# Patient Record
Sex: FEMALE | Race: BLACK OR AFRICAN AMERICAN | NOT HISPANIC OR LATINO | Employment: FULL TIME | URBAN - METROPOLITAN AREA
[De-identification: names, ages, dates, MRNs, and addresses within clinical notes are randomized per-mention and may not be internally consistent; named-entity substitution may affect disease eponyms.]

---

## 2023-03-13 ENCOUNTER — HOSPITAL ENCOUNTER (EMERGENCY)
Facility: HOSPITAL | Age: 23
Discharge: HOME | End: 2023-03-14
Attending: EMERGENCY MEDICINE
Payer: MEDICAID

## 2023-03-13 DIAGNOSIS — M54.42 ACUTE BILATERAL LOW BACK PAIN WITH BILATERAL SCIATICA: Primary | ICD-10-CM

## 2023-03-13 DIAGNOSIS — M54.41 ACUTE BILATERAL LOW BACK PAIN WITH BILATERAL SCIATICA: Primary | ICD-10-CM

## 2023-03-13 PROCEDURE — 81025 URINE PREGNANCY TEST: CPT | Performed by: NURSE PRACTITIONER

## 2023-03-13 PROCEDURE — 99284 EMERGENCY DEPT VISIT MOD MDM: CPT

## 2023-03-13 PROCEDURE — 81003 URINALYSIS AUTO W/O SCOPE: CPT | Performed by: NURSE PRACTITIONER

## 2023-03-13 NOTE — Clinical Note
"Earl Rizzotrena Mcelroy was seen and treated in our emergency department on 3/13/2023.  She may return to work on 03/16/2023.       If you have any questions or concerns, please don't hesitate to call.      Tay Guerrero NP"

## 2023-03-14 VITALS
DIASTOLIC BLOOD PRESSURE: 84 MMHG | TEMPERATURE: 97 F | HEART RATE: 89 BPM | SYSTOLIC BLOOD PRESSURE: 149 MMHG | WEIGHT: 215.06 LBS | RESPIRATION RATE: 16 BRPM | OXYGEN SATURATION: 99 %

## 2023-03-14 LAB

## 2023-03-14 PROCEDURE — 96372 THER/PROPH/DIAG INJ SC/IM: CPT | Performed by: NURSE PRACTITIONER

## 2023-03-14 PROCEDURE — 63600175 PHARM REV CODE 636 W HCPCS: Performed by: NURSE PRACTITIONER

## 2023-03-14 RX ORDER — METHOCARBAMOL 500 MG/1
500 TABLET, FILM COATED ORAL 3 TIMES DAILY PRN
Qty: 30 TABLET | Refills: 0 | Status: SHIPPED | OUTPATIENT
Start: 2023-03-14 | End: 2023-03-19

## 2023-03-14 RX ORDER — DICLOFENAC SODIUM 50 MG/1
50 TABLET, DELAYED RELEASE ORAL 3 TIMES DAILY PRN
Qty: 15 TABLET | Refills: 0 | Status: ON HOLD | OUTPATIENT
Start: 2023-03-14 | End: 2023-08-09 | Stop reason: HOSPADM

## 2023-03-14 RX ORDER — DEXAMETHASONE 4 MG/1
4 TABLET ORAL DAILY
Qty: 5 TABLET | Refills: 0 | Status: SHIPPED | OUTPATIENT
Start: 2023-03-14 | End: 2023-03-19

## 2023-03-14 RX ORDER — KETOROLAC TROMETHAMINE 30 MG/ML
30 INJECTION, SOLUTION INTRAMUSCULAR; INTRAVENOUS
Status: COMPLETED | OUTPATIENT
Start: 2023-03-14 | End: 2023-03-14

## 2023-03-14 RX ORDER — KETOROLAC TROMETHAMINE 10 MG/1
10 TABLET, FILM COATED ORAL
Status: DISCONTINUED | OUTPATIENT
Start: 2023-03-14 | End: 2023-03-14

## 2023-03-14 RX ADMIN — KETOROLAC TROMETHAMINE 30 MG: 30 INJECTION, SOLUTION INTRAMUSCULAR; INTRAVENOUS at 01:03

## 2023-03-14 NOTE — ED PROVIDER NOTES
HISTORY     Chief Complaint   Patient presents with    Back Pain     Lower back pain radiating to hips and thigh. Denies CP, trauma or strain to back. OTC meds with no relief. Ambulatory to triage      Review of patient's allergies indicates:  Not on File     HPI   The history is provided by the patient.   Back Pain   This is a new problem. The current episode started two days ago. The problem occurs throughout the day. The problem has been waxing and waning. The pain is associated with no known injury. The pain is present in the lumbar spine. The quality of the pain is described as aching. The pain radiates to the left thigh and right thigh. The pain is at a severity of 7/10. The symptoms are aggravated by bending, twisting and certain positions. Associated symptoms include leg pain. Pertinent negatives include no chest pain, no fever, no numbness, no weight loss, no headaches, no abdominal pain, no abdominal swelling, no bowel incontinence, no perianal numbness, no bladder incontinence, no dysuria, no pelvic pain, no paresthesias, no paresis, no tingling and no weakness. She has tried NSAIDs for the symptoms. The treatment provided no relief.      PCP: GUILLAUME Farias     Past Medical History:  No past medical history on file.     Past Surgical History:  No past surgical history on file.     Family History:  No family history on file.     Social History:  Social History     Tobacco Use    Smoking status: Not on file    Smokeless tobacco: Not on file   Substance and Sexual Activity    Alcohol use: Not on file    Drug use: Not on file    Sexual activity: Not on file         ROS   Review of Systems   Constitutional:  Negative for fever and weight loss.   HENT:  Negative for sore throat.    Respiratory:  Negative for shortness of breath.    Cardiovascular:  Negative for chest pain.   Gastrointestinal:  Negative for abdominal pain, bowel incontinence and nausea.   Genitourinary:  Negative for bladder  incontinence, dysuria and pelvic pain.   Musculoskeletal:  Positive for back pain.        Leg pain   Skin:  Negative for rash.   Neurological:  Negative for tingling, weakness, numbness, headaches and paresthesias.   Hematological:  Does not bruise/bleed easily.     PHYSICAL EXAM     Initial Vitals [03/13/23 2058]   BP Pulse Resp Temp SpO2   (!) 140/76 80 17 98 °F (36.7 °C) 97 %      MAP       --           Physical Exam    Constitutional: She appears well-developed and well-nourished. No distress.   HENT:   Head: Normocephalic and atraumatic.   Eyes: Conjunctivae are normal. Pupils are equal, round, and reactive to light.   Neck: Neck supple.   Normal range of motion.  Cardiovascular:  Normal rate, regular rhythm and normal heart sounds.           Pulmonary/Chest: Breath sounds normal.   Abdominal: Abdomen is soft. Bowel sounds are normal. She exhibits no distension. There is no abdominal tenderness. There is no rebound.   Musculoskeletal:         General: No edema. Normal range of motion.      Cervical back: Normal range of motion and neck supple.     Neurological: She is alert and oriented to person, place, and time. She has normal strength.   Skin: Skin is warm and dry.   Psychiatric: She has a normal mood and affect.        ED COURSE   Procedures  ED ONGOING VITALS:  Vitals:    03/13/23 2058 03/13/23 2315 03/14/23 0056   BP: (!) 140/76 135/77 (!) 149/84   Pulse: 80 85 89   Resp: 17 16 16   Temp: 98 °F (36.7 °C) 97.4 °F (36.3 °C) 97.2 °F (36.2 °C)   TempSrc: Oral  Oral   SpO2: 97% 98% 99%   Weight: 97.6 kg (215 lb 0.9 oz)           ABNORMAL LAB VALUES:  Labs Reviewed   URINALYSIS, REFLEX TO URINE CULTURE    Narrative:     Specimen Source->Urine   PREGNANCY TEST, URINE RAPID    Narrative:     Specimen Source->Urine         ALL LAB VALUES:  Results for orders placed or performed during the hospital encounter of 03/13/23   Urinalysis, Reflex to Urine Culture Urine, Clean Catch    Specimen: Urine   Result Value Ref  Range    Specimen UA Urine, Clean Catch     Color, UA Yellow Yellow, Straw, Suki    Appearance, UA Clear Clear    pH, UA 8.0 5.0 - 8.0    Specific Gravity, UA 1.025 1.005 - 1.030    Protein, UA Negative Negative    Glucose, UA Negative Negative    Ketones, UA Negative Negative    Bilirubin (UA) Negative Negative    Occult Blood UA Negative Negative    Nitrite, UA Negative Negative    Urobilinogen, UA Negative <2.0 EU/dL    Leukocytes, UA Negative Negative   Pregnancy, urine rapid (UPT)   Result Value Ref Range    Preg Test, Ur Negative            RADIOLOGY STUDIES:  Imaging Results              X-Ray Lumbar Spine Ap And Lateral (Final result)  Result time 03/14/23 08:56:15      Final result by Terry López MD (03/14/23 08:56:15)                   Impression:      No acute abnormality.      Electronically signed by: Terry López  Date:    03/14/2023  Time:    08:56               Narrative:    EXAMINATION:  XR LUMBAR SPINE AP AND LATERAL    CLINICAL HISTORY:  low back pain;    TECHNIQUE:  Three views of the lumbar spine were performed.    COMPARISON:  None    FINDINGS:  Alignment: Alignment is maintained.    Vertebrae: Vertebral body heights are maintained.  No suspicious appearing lytic or blastic lesions.    Discs and facets: Disc heights are maintained. Facet joints are unremarkable.    Miscellaneous: No additional findings.                                                The above vital signs and test results have been reviewed by the emergency provider.     ED Medications:  Discharge Medication List as of 3/14/2023  1:22 AM        START taking these medications    Details   dexAMETHasone (DECADRON) 4 MG Tab Take 1 tablet (4 mg total) by mouth once daily. for 5 days, Starting Tue 3/14/2023, Until Sun 3/19/2023, Print      diclofenac (VOLTAREN) 50 MG EC tablet Take 1 tablet (50 mg total) by mouth 3 (three) times daily as needed., Starting Tue 3/14/2023, Print      methocarbamoL (ROBAXIN) 500 MG Tab Take 1 tablet  (500 mg total) by mouth 3 (three) times daily as needed., Starting Tue 3/14/2023, Until Sun 3/19/2023 at 2359, Print           Discharge Medications:  Discharge Medication List as of 3/14/2023  1:22 AM        START taking these medications    Details   dexAMETHasone (DECADRON) 4 MG Tab Take 1 tablet (4 mg total) by mouth once daily. for 5 days, Starting Tue 3/14/2023, Until Sun 3/19/2023, Print      diclofenac (VOLTAREN) 50 MG EC tablet Take 1 tablet (50 mg total) by mouth 3 (three) times daily as needed., Starting Tue 3/14/2023, Print      methocarbamoL (ROBAXIN) 500 MG Tab Take 1 tablet (500 mg total) by mouth 3 (three) times daily as needed., Starting Tue 3/14/2023, Until Sun 3/19/2023 at 2359, Print             Follow-up Information       GUILLAUME Farias.    Specialty: Family Medicine  Contact information:  [address] [place] [address] [phone]               [facility] - Emergency Dept..    Specialty: Emergency Medicine  Contact information:  [address] [facility] [address] [phone]                          I discussed with patient and/or family/caretaker that evaluation in the ED does not suggest any emergent or life threatening medical conditions requiring immediate intervention beyond what was provided in the ED, and I believe patient is safe for discharge. Regardless, an unremarkable evaluation in the ED does not preclude the development or presence of a serious or life threatening condition. As such, patient was instructed to return immediately for any worsening or change in current symptoms.    Pre-hypertension/Hypertension: The pt has been informed that they may have pre-hypertension or hypertension based on a blood pressure reading in the ED. I recommend that the pt call the PCP listed on their discharge instructions or a physician of their choice this week to arrange f/u for further evaluation of possible pre-hypertension or hypertension.      Regarding BACK PAIN, I advised patient to rest and slowly start to increase activity as pain decreases or as tolerable.  Also recommend the use of ice and heat. Explained to patient that ice will help decrease swelling and pain and prevent tissue damage (verbalized importance of covering ice with a towel and not applying it directly to skin and applying it for 20-30 minutes every 2 hours as needed). Further explained that heat will help decrease pain and muscle spasms (instructed patient to not fall asleep with heating pad and to apply heat to lower back for 20-30 minutes every 2 hours as needed). For prevention, I recommended that the patient use correct body movements (bend at the hips and knees when picking up objects and use leg muscles as you lift the load; keep objects close to chest when lifting it; and avoid twisting or lifting anything above the waist; change position often when standing for long periods of time; never reach, pull, or push while seated; exercise frequently and warm up before exercising; and maintain a healthy weight.  Follow up with primary care provider if no improvement is noticed in next three days.  Take all medications as prescribed and avoid operating heavy machinery or driving if prescribed pain medications or muscle relaxants.  Instructed patient to return to the emergency department if they develop incontinence, notice increased swelling or pain in lower back; begin to have difficulty moving lower extremities; or develop numbness to legs.       MEDICAL DECISION MAKING                 CLINICAL IMPRESSION       ICD-10-CM ICD-9-CM   1. Acute bilateral low back pain with bilateral sciatica  M54.42 724.2    M54.41 724.3       Disposition:   Disposition: Discharged  Condition: Stable       Tay Guerrero NP  03/14/23 1257

## 2023-03-14 NOTE — ED NOTES
Patient was given urine cup and instructed on clean catch specimen collection and sent back to [facility].

## 2023-03-14 NOTE — FIRST PROVIDER EVALUATION
Medical screening examination initiated.  I have conducted a focused provider triage encounter, findings are as follows:    Brief history of present illness:  22-year-old female presents emergency room with lower back pain really radiating to bilateral thighs    Vitals:    03/13/23 2058   BP: (!) 140/76   BP Location: Right arm   Patient Position: Sitting   Pulse: 80   Resp: 17   Temp: 98 °F (36.7 °C)   TempSrc: Oral   SpO2: 97%   Weight: 97.6 kg (215 lb 0.9 oz)       Pertinent physical exam:  Vital signs stable no acute distress no abnormal gait    Brief workup plan:  Urinalysis pregnancy test radiographs if negative    Preliminary workup initiated; this workup will be continued and followed by the physician or advanced practice provider that is assigned to the patient when roomed.

## 2023-04-13 ENCOUNTER — HOSPITAL ENCOUNTER (EMERGENCY)
Facility: HOSPITAL | Age: 23
Discharge: HOME | End: 2023-04-13
Attending: EMERGENCY MEDICINE
Payer: MEDICAID

## 2023-04-13 VITALS
WEIGHT: 222.31 LBS | DIASTOLIC BLOOD PRESSURE: 64 MMHG | TEMPERATURE: 98 F | BODY MASS INDEX: 37.95 KG/M2 | OXYGEN SATURATION: 98 % | HEIGHT: 64 IN | HEART RATE: 82 BPM | RESPIRATION RATE: 20 BRPM | SYSTOLIC BLOOD PRESSURE: 122 MMHG

## 2023-04-13 DIAGNOSIS — R11.2 NAUSEA VOMITING AND DIARRHEA: ICD-10-CM

## 2023-04-13 DIAGNOSIS — R19.7 NAUSEA VOMITING AND DIARRHEA: ICD-10-CM

## 2023-04-13 DIAGNOSIS — R10.31 RIGHT LOWER QUADRANT ABDOMINAL PAIN: Primary | ICD-10-CM

## 2023-04-13 LAB
ALBUMIN SERPL BCP-MCNC: 3.7 G/DL (ref 3.5–5.2)
ALP SERPL-CCNC: 81 U/L (ref 55–135)
ALT SERPL W/O P-5'-P-CCNC: 8 U/L (ref 10–44)
ANION GAP SERPL CALC-SCNC: 7 MMOL/L (ref 8–16)
AST SERPL-CCNC: 15 U/L (ref 10–40)
B-HCG UR QL: NEGATIVE
BASOPHILS # BLD AUTO: 0.02 K/UL (ref 0–0.2)
BASOPHILS NFR BLD: 0.2 % (ref 0–1.9)
BILIRUB SERPL-MCNC: 0.5 MG/DL (ref 0.1–1)
BILIRUB UR QL STRIP: NEGATIVE
BUN SERPL-MCNC: 6 MG/DL (ref 6–20)
CALCIUM SERPL-MCNC: 9.5 MG/DL (ref 8.7–10.5)
CHLORIDE SERPL-SCNC: 107 MMOL/L (ref 95–110)
CLARITY UR: CLEAR
CO2 SERPL-SCNC: 23 MMOL/L (ref 23–29)
COLOR UR: YELLOW
CREAT SERPL-MCNC: 0.7 MG/DL (ref 0.5–1.4)
DIFFERENTIAL METHOD: ABNORMAL
EOSINOPHIL # BLD AUTO: 0.1 K/UL (ref 0–0.5)
EOSINOPHIL NFR BLD: 1.1 % (ref 0–8)
ERYTHROCYTE [DISTWIDTH] IN BLOOD BY AUTOMATED COUNT: 14 % (ref 11.5–14.5)
EST. GFR  (NO RACE VARIABLE): >60 ML/MIN/1.73 M^2
GLUCOSE SERPL-MCNC: 88 MG/DL (ref 70–110)
GLUCOSE UR QL STRIP: NEGATIVE
HCT VFR BLD AUTO: 36.3 % (ref 37–48.5)
HCV AB SERPL QL IA: NEGATIVE
HEP C VIRUS HOLD SPECIMEN: NORMAL
HGB BLD-MCNC: 11.5 G/DL (ref 12–16)
HGB UR QL STRIP: NEGATIVE
HIV 1+2 AB+HIV1 P24 AG SERPL QL IA: NEGATIVE
IMM GRANULOCYTES # BLD AUTO: 0.03 K/UL (ref 0–0.04)
IMM GRANULOCYTES NFR BLD AUTO: 0.3 % (ref 0–0.5)
KETONES UR QL STRIP: NEGATIVE
LEUKOCYTE ESTERASE UR QL STRIP: NEGATIVE
LIPASE SERPL-CCNC: 21 U/L (ref 4–60)
LYMPHOCYTES # BLD AUTO: 2.1 K/UL (ref 1–4.8)
LYMPHOCYTES NFR BLD: 22.6 % (ref 18–48)
MCH RBC QN AUTO: 27.1 PG (ref 27–31)
MCHC RBC AUTO-ENTMCNC: 31.7 G/DL (ref 32–36)
MCV RBC AUTO: 86 FL (ref 82–98)
MONOCYTES # BLD AUTO: 0.6 K/UL (ref 0.3–1)
MONOCYTES NFR BLD: 6.3 % (ref 4–15)
NEUTROPHILS # BLD AUTO: 6.4 K/UL (ref 1.8–7.7)
NEUTROPHILS NFR BLD: 69.5 % (ref 38–73)
NITRITE UR QL STRIP: NEGATIVE
NRBC BLD-RTO: 0 /100 WBC
PH UR STRIP: 8 [PH] (ref 5–8)
PLATELET # BLD AUTO: 296 K/UL (ref 150–450)
PMV BLD AUTO: 10.3 FL (ref 9.2–12.9)
POTASSIUM SERPL-SCNC: 4.2 MMOL/L (ref 3.5–5.1)
PROT SERPL-MCNC: 7.8 G/DL (ref 6–8.4)
PROT UR QL STRIP: NEGATIVE
RBC # BLD AUTO: 4.24 M/UL (ref 4–5.4)
SODIUM SERPL-SCNC: 137 MMOL/L (ref 136–145)
SP GR UR STRIP: 1.01 (ref 1–1.03)
URN SPEC COLLECT METH UR: NORMAL
UROBILINOGEN UR STRIP-ACNC: NEGATIVE EU/DL
WBC # BLD AUTO: 9.17 K/UL (ref 3.9–12.7)

## 2023-04-13 PROCEDURE — 63600175 PHARM REV CODE 636 W HCPCS

## 2023-04-13 PROCEDURE — 99285 EMERGENCY DEPT VISIT HI MDM: CPT | Mod: 25

## 2023-04-13 PROCEDURE — 81003 URINALYSIS AUTO W/O SCOPE: CPT | Performed by: REGISTERED NURSE

## 2023-04-13 PROCEDURE — 81025 URINE PREGNANCY TEST: CPT | Performed by: REGISTERED NURSE

## 2023-04-13 PROCEDURE — 83690 ASSAY OF LIPASE: CPT | Performed by: REGISTERED NURSE

## 2023-04-13 PROCEDURE — 25000003 PHARM REV CODE 250: Performed by: REGISTERED NURSE

## 2023-04-13 PROCEDURE — 80053 COMPREHEN METABOLIC PANEL: CPT | Performed by: REGISTERED NURSE

## 2023-04-13 PROCEDURE — 86803 HEPATITIS C AB TEST: CPT | Performed by: EMERGENCY MEDICINE

## 2023-04-13 PROCEDURE — 87389 HIV-1 AG W/HIV-1&-2 AB AG IA: CPT | Performed by: EMERGENCY MEDICINE

## 2023-04-13 PROCEDURE — 96374 THER/PROPH/DIAG INJ IV PUSH: CPT

## 2023-04-13 PROCEDURE — 96361 HYDRATE IV INFUSION ADD-ON: CPT

## 2023-04-13 PROCEDURE — 85025 COMPLETE CBC W/AUTO DIFF WBC: CPT | Performed by: REGISTERED NURSE

## 2023-04-13 RX ORDER — ONDANSETRON 2 MG/ML
INJECTION INTRAMUSCULAR; INTRAVENOUS
Status: COMPLETED
Start: 2023-04-13 | End: 2023-04-13

## 2023-04-13 RX ORDER — ONDANSETRON 2 MG/ML
4 INJECTION INTRAMUSCULAR; INTRAVENOUS
Status: COMPLETED | OUTPATIENT
Start: 2023-04-13 | End: 2023-04-13

## 2023-04-13 RX ORDER — ACETAMINOPHEN 325 MG/1
650 TABLET ORAL
Status: COMPLETED | OUTPATIENT
Start: 2023-04-13 | End: 2023-04-13

## 2023-04-13 RX ORDER — ONDANSETRON 4 MG/1
4 TABLET, ORALLY DISINTEGRATING ORAL EVERY 8 HOURS PRN
Qty: 12 TABLET | Refills: 0 | Status: SHIPPED | OUTPATIENT
Start: 2023-04-13 | End: 2023-10-12

## 2023-04-13 RX ORDER — ACETAMINOPHEN 325 MG/1
TABLET ORAL
Status: COMPLETED
Start: 2023-04-13 | End: 2023-04-13

## 2023-04-13 RX ADMIN — SODIUM CHLORIDE 1000 ML: 9 INJECTION, SOLUTION INTRAVENOUS at 12:04

## 2023-04-13 RX ADMIN — ONDANSETRON 4 MG: 2 INJECTION INTRAMUSCULAR; INTRAVENOUS at 12:04

## 2023-04-13 RX ADMIN — ACETAMINOPHEN 650 MG: 325 TABLET ORAL at 12:04

## 2023-04-13 NOTE — Clinical Note
"Earl Rizzotrena Mcelroy was seen and treated in our emergency department on 4/13/2023.  She may return to work on 04/15/2023.       If you have any questions or concerns, please don't hesitate to call.      Avinash Pollard Jr., FNP"

## 2023-04-13 NOTE — Clinical Note
"Earl Rizzotrena Mcelroy was seen and treated in our emergency department on 4/13/2023.  She may return to school on 04/15/2023.      If you have any questions or concerns, please don't hesitate to call.      Avinash Pollard Jr., FNP"

## 2023-04-13 NOTE — ED PROVIDER NOTES
Encounter Date: 4/13/2023       History     Chief Complaint   Patient presents with    Abdominal Pain     RLQ pain onset 2 days ago, constant, worsening. Nausea, diarrhea onset last night.     22-year-old female presents emergency department with complaints of right flank pain that began 2 days ago.  Associated symptoms include nausea/vomiting/diarrhea that began last night.  Patient denies any fever, chills, chest pain, shortness of breath or any other symptoms at this time.    The history is provided by the patient.   Review of patient's allergies indicates:  No Known Allergies  No past medical history on file.  No past surgical history on file.  No family history on file.     Review of Systems   Constitutional:  Negative for fever.   HENT:  Negative for sore throat.    Respiratory:  Negative for shortness of breath.    Cardiovascular:  Negative for chest pain.   Gastrointestinal:  Positive for abdominal pain, diarrhea, nausea and vomiting.   Genitourinary:  Negative for dysuria.   Musculoskeletal:  Negative for back pain.   Skin:  Negative for rash.   Neurological:  Negative for weakness.   Hematological:  Does not bruise/bleed easily.   All other systems reviewed and are negative.    Physical Exam     Initial Vitals [04/13/23 1123]   BP Pulse Resp Temp SpO2   (!) 120/59 85 16 97.9 °F (36.6 °C) 98 %      MAP       --         Physical Exam    Constitutional: She appears well-developed and well-nourished. She is not diaphoretic. No distress.   HENT:   Head: Normocephalic and atraumatic.   Eyes: Conjunctivae and EOM are normal. Pupils are equal, round, and reactive to light.   Neck: Neck supple.   Normal range of motion.  Cardiovascular:  Normal rate, regular rhythm and normal heart sounds.           No murmur heard.  Pulmonary/Chest: Breath sounds normal. No respiratory distress. She has no wheezes. She has no rales.   Abdominal: Abdomen is soft. Bowel sounds are normal. There is abdominal tenderness in the right  upper quadrant and right lower quadrant.   There is right CVA tenderness.There is no rebound and no guarding.   Musculoskeletal:         General: No tenderness or edema. Normal range of motion.      Cervical back: Normal range of motion and neck supple.     Neurological: She is alert and oriented to person, place, and time. No cranial nerve deficit. GCS score is 15. GCS eye subscore is 4. GCS verbal subscore is 5. GCS motor subscore is 6.   Skin: Skin is warm and dry. Capillary refill takes less than 2 seconds.   Psychiatric: She has a normal mood and affect. Thought content normal.       ED Course   Procedures  Labs Reviewed   CBC W/ AUTO DIFFERENTIAL - Abnormal; Notable for the following components:       Result Value    Hemoglobin 11.5 (*)     Hematocrit 36.3 (*)     MCHC 31.7 (*)     All other components within normal limits    Narrative:     Release to patient->Immediate   COMPREHENSIVE METABOLIC PANEL - Abnormal; Notable for the following components:    ALT 8 (*)     Anion Gap 7 (*)     All other components within normal limits    Narrative:     Release to patient->Immediate   HIV 1 / 2 ANTIBODY    Narrative:     Release to patient->Immediate   HEPATITIS C ANTIBODY    Narrative:     Release to patient->Immediate   HEP C VIRUS HOLD SPECIMEN    Narrative:     Release to patient->Immediate   URINALYSIS, REFLEX TO URINE CULTURE    Narrative:     Specimen Source->Urine   PREGNANCY TEST, URINE RAPID    Narrative:     Specimen Source->Urine   LIPASE    Narrative:     Release to patient->Immediate          Imaging Results              CT Renal Stone Study ABD Pelvis WO (Final result)  Result time 04/13/23 13:14:38      Final result by Alex Byers MD (04/13/23 13:14:38)                   Impression:      No evidence of obstructive uropathy.    Evaluation of solid organ and vascular pathology is limited due to lack of IV contrast.    All CT scans at this facility use dose modulation, iterative reconstruction,  and/or weight based dosing when appropriate to reduce radiation dose to as low as reasonable achievable.      Electronically signed by: Alex Byers MD  Date:    04/13/2023  Time:    13:14               Narrative:    EXAMINATION:  CT RENAL STONE STUDY ABD PELVIS WO    CLINICAL HISTORY:  Flank pain, kidney stone suspected;    TECHNIQUE:  Low dose axial images, sagittal and coronal reformations were obtained from the lung bases to the pubic symphysis.  Oral contrast was not administered.    COMPARISON:  None    FINDINGS:  Heart: Normal size. No effusion.    Lung Bases: Clear.    Liver: Normal size and attenuation. No focal lesions.    Gallbladder: No calcified gallstones.    Bile Ducts: No dilatation.    Pancreas: No obvious mass. No peripancreatic fat stranding.    Spleen: Normal.    Adrenals: Normal.    Kidneys/Ureters: No mass, hydroureteronephrosis, or nephroureterolithiasis.    Bladder: No wall thickening.    Reproductive organs: Normal.  Fluid is seen within the endometrial canal.  2.3 cm cyst or follicle suspected within the left ovary.    GI Tract/Mesentery: No evidence of bowel obstruction or inflammation.  Mild constipation.  No evidence of appendicitis.    Peritoneal Space: No ascites or free air.    Retroperitoneum: No significant adenopathy.    Abdominal wall: Normal.    Vasculature: No aneurysm.    Bones: No acute fracture. No suspicious lytic or sclerotic lesions.                                       Medications   sodium chloride 0.9% bolus 1,000 mL 1,000 mL (1,000 mLs Intravenous New Bag 4/13/23 1207)   ondansetron injection 4 mg (4 mg Intravenous Given 4/13/23 1205)   acetaminophen tablet 650 mg (0 mg Oral Override Pull 4/13/23 1215)     Medical Decision Making:   Clinical Tests:   Lab Tests: Ordered and Reviewed  Radiological Study: Ordered and Reviewed                        Clinical Impression:   Final diagnoses:  [R10.31] Right lower quadrant abdominal pain (Primary)  [R11.2, R19.7] Nausea  vomiting and diarrhea        ED Disposition Condition    Discharge Stable          ED Prescriptions       Medication Sig Dispense Start Date End Date Auth. Provider    ondansetron (ZOFRAN-ODT) 4 MG TbDL Take 1 tablet (4 mg total) by mouth every 8 (eight) hours as needed (vomiting). 12 tablet 4/13/2023 -- GUILLAUME Rivera Jr.          Follow-up Information       Follow up With Specialties Details Why Contact Info    [facility] In 1 week  [address] [place] [address] [phone]               GUILLAUME Rivera Jr.  04/13/23 3101

## 2023-08-05 ENCOUNTER — HOSPITAL ENCOUNTER (EMERGENCY)
Facility: HOSPITAL | Age: 23
Discharge: OTHER INSTITUTION | End: 2023-08-05
Attending: EMERGENCY MEDICINE
Payer: MEDICAID

## 2023-08-05 ENCOUNTER — HOSPITAL ENCOUNTER (INPATIENT)
Facility: HOSPITAL | Age: 23
LOS: 4 days | Discharge: HOME | DRG: 032 | End: 2023-08-09
Attending: STUDENT IN AN ORGANIZED HEALTH CARE EDUCATION/TRAINING PROGRAM | Admitting: NEUROLOGICAL SURGERY
Payer: MEDICAID

## 2023-08-05 VITALS
TEMPERATURE: 99 F | SYSTOLIC BLOOD PRESSURE: 118 MMHG | DIASTOLIC BLOOD PRESSURE: 62 MMHG | RESPIRATION RATE: 16 BRPM | HEIGHT: 64 IN | BODY MASS INDEX: 38.3 KG/M2 | WEIGHT: 224.31 LBS | HEART RATE: 66 BPM | OXYGEN SATURATION: 98 %

## 2023-08-05 DIAGNOSIS — G91.9 HYDROCEPHALUS, UNSPECIFIED TYPE: ICD-10-CM

## 2023-08-05 DIAGNOSIS — G91.9 HYDROCEPHALUS, UNSPECIFIED TYPE: Primary | ICD-10-CM

## 2023-08-05 DIAGNOSIS — Q03.0 AQUEDUCTAL STENOSIS: ICD-10-CM

## 2023-08-05 DIAGNOSIS — R51.9 NONINTRACTABLE HEADACHE, UNSPECIFIED CHRONICITY PATTERN, UNSPECIFIED HEADACHE TYPE: ICD-10-CM

## 2023-08-05 DIAGNOSIS — R51.9 NONINTRACTABLE HEADACHE, UNSPECIFIED CHRONICITY PATTERN, UNSPECIFIED HEADACHE TYPE: Primary | ICD-10-CM

## 2023-08-05 LAB
ALBUMIN SERPL BCP-MCNC: 3.8 G/DL (ref 3.5–5.2)
ALP SERPL-CCNC: 81 U/L (ref 55–135)
ALT SERPL W/O P-5'-P-CCNC: 11 U/L (ref 10–44)
ANION GAP SERPL CALC-SCNC: 13 MMOL/L (ref 8–16)
AST SERPL-CCNC: 27 U/L (ref 10–40)
B-HCG UR QL: NEGATIVE
BASOPHILS # BLD AUTO: 0.02 K/UL (ref 0–0.2)
BASOPHILS NFR BLD: 0.2 % (ref 0–1.9)
BILIRUB SERPL-MCNC: 0.8 MG/DL (ref 0.1–1)
BILIRUB UR QL STRIP: NEGATIVE
BUN SERPL-MCNC: 10 MG/DL (ref 6–20)
CALCIUM SERPL-MCNC: 9.8 MG/DL (ref 8.7–10.5)
CHLORIDE SERPL-SCNC: 106 MMOL/L (ref 95–110)
CLARITY UR: ABNORMAL
CO2 SERPL-SCNC: 16 MMOL/L (ref 23–29)
COLOR UR: YELLOW
CREAT SERPL-MCNC: 0.8 MG/DL (ref 0.5–1.4)
DIFFERENTIAL METHOD: ABNORMAL
EOSINOPHIL # BLD AUTO: 0.1 K/UL (ref 0–0.5)
EOSINOPHIL NFR BLD: 0.7 % (ref 0–8)
ERYTHROCYTE [DISTWIDTH] IN BLOOD BY AUTOMATED COUNT: 13.5 % (ref 11.5–14.5)
EST. GFR  (NO RACE VARIABLE): >60 ML/MIN/1.73 M^2
GLUCOSE SERPL-MCNC: 83 MG/DL (ref 70–110)
GLUCOSE UR QL STRIP: NEGATIVE
HCT VFR BLD AUTO: 35.7 % (ref 37–48.5)
HGB BLD-MCNC: 11.2 G/DL (ref 12–16)
HGB UR QL STRIP: NEGATIVE
IMM GRANULOCYTES # BLD AUTO: 0.06 K/UL (ref 0–0.04)
IMM GRANULOCYTES NFR BLD AUTO: 0.6 % (ref 0–0.5)
KETONES UR QL STRIP: NEGATIVE
LEUKOCYTE ESTERASE UR QL STRIP: NEGATIVE
LYMPHOCYTES # BLD AUTO: 2.3 K/UL (ref 1–4.8)
LYMPHOCYTES NFR BLD: 22.3 % (ref 18–48)
MCH RBC QN AUTO: 26.4 PG (ref 27–31)
MCHC RBC AUTO-ENTMCNC: 31.4 G/DL (ref 32–36)
MCV RBC AUTO: 84 FL (ref 82–98)
MONOCYTES # BLD AUTO: 0.8 K/UL (ref 0.3–1)
MONOCYTES NFR BLD: 8 % (ref 4–15)
NEUTROPHILS # BLD AUTO: 6.9 K/UL (ref 1.8–7.7)
NEUTROPHILS NFR BLD: 68.2 % (ref 38–73)
NITRITE UR QL STRIP: NEGATIVE
NRBC BLD-RTO: 0 /100 WBC
PH UR STRIP: 6 [PH] (ref 5–8)
PLATELET # BLD AUTO: 253 K/UL (ref 150–450)
PMV BLD AUTO: 10.1 FL (ref 9.2–12.9)
POCT GLUCOSE: 86 MG/DL (ref 70–110)
POTASSIUM SERPL-SCNC: 4.9 MMOL/L (ref 3.5–5.1)
PROT SERPL-MCNC: 8.1 G/DL (ref 6–8.4)
PROT UR QL STRIP: NEGATIVE
RBC # BLD AUTO: 4.25 M/UL (ref 4–5.4)
SODIUM SERPL-SCNC: 135 MMOL/L (ref 136–145)
SP GR UR STRIP: 1.02 (ref 1–1.03)
URN SPEC COLLECT METH UR: ABNORMAL
UROBILINOGEN UR STRIP-ACNC: NEGATIVE EU/DL
WBC # BLD AUTO: 10.14 K/UL (ref 3.9–12.7)

## 2023-08-05 PROCEDURE — 82962 GLUCOSE BLOOD TEST: CPT

## 2023-08-05 PROCEDURE — 99285 EMERGENCY DEPT VISIT HI MDM: CPT | Mod: 25

## 2023-08-05 PROCEDURE — 25500020 PHARM REV CODE 255: Performed by: STUDENT IN AN ORGANIZED HEALTH CARE EDUCATION/TRAINING PROGRAM

## 2023-08-05 PROCEDURE — G0378 HOSPITAL OBSERVATION PER HR: HCPCS

## 2023-08-05 PROCEDURE — 99285 EMERGENCY DEPT VISIT HI MDM: CPT | Mod: 25,27

## 2023-08-05 PROCEDURE — 81025 URINE PREGNANCY TEST: CPT | Performed by: EMERGENCY MEDICINE

## 2023-08-05 PROCEDURE — 85025 COMPLETE CBC W/AUTO DIFF WBC: CPT | Performed by: REGISTERED NURSE

## 2023-08-05 PROCEDURE — 81003 URINALYSIS AUTO W/O SCOPE: CPT | Performed by: EMERGENCY MEDICINE

## 2023-08-05 PROCEDURE — 80053 COMPREHEN METABOLIC PANEL: CPT | Performed by: REGISTERED NURSE

## 2023-08-05 PROCEDURE — A9585 GADOBUTROL INJECTION: HCPCS | Performed by: STUDENT IN AN ORGANIZED HEALTH CARE EDUCATION/TRAINING PROGRAM

## 2023-08-05 PROCEDURE — 25000003 PHARM REV CODE 250: Performed by: REGISTERED NURSE

## 2023-08-05 PROCEDURE — 11000001 HC ACUTE MED/SURG PRIVATE ROOM

## 2023-08-05 RX ORDER — ONDANSETRON 4 MG/1
4 TABLET, ORALLY DISINTEGRATING ORAL
Status: COMPLETED | OUTPATIENT
Start: 2023-08-05 | End: 2023-08-05

## 2023-08-05 RX ORDER — DEXTROSE MONOHYDRATE 100 MG/ML
25 INJECTION, SOLUTION INTRAVENOUS
Status: DISCONTINUED | OUTPATIENT
Start: 2023-08-05 | End: 2023-08-09 | Stop reason: HOSPADM

## 2023-08-05 RX ORDER — MORPHINE SULFATE 2 MG/ML
6 INJECTION, SOLUTION INTRAMUSCULAR; INTRAVENOUS
Status: ACTIVE | OUTPATIENT
Start: 2023-08-05 | End: 2023-08-06

## 2023-08-05 RX ORDER — INSULIN ASPART 100 [IU]/ML
0-5 INJECTION, SOLUTION INTRAVENOUS; SUBCUTANEOUS
Status: DISCONTINUED | OUTPATIENT
Start: 2023-08-05 | End: 2023-08-09 | Stop reason: HOSPADM

## 2023-08-05 RX ORDER — ACETAMINOPHEN 325 MG/1
650 TABLET ORAL EVERY 4 HOURS PRN
Status: DISCONTINUED | OUTPATIENT
Start: 2023-08-05 | End: 2023-08-09 | Stop reason: HOSPADM

## 2023-08-05 RX ORDER — SODIUM CHLORIDE 9 MG/ML
INJECTION, SOLUTION INTRAVENOUS CONTINUOUS
Status: DISCONTINUED | OUTPATIENT
Start: 2023-08-06 | End: 2023-08-09 | Stop reason: HOSPADM

## 2023-08-05 RX ORDER — METFORMIN HYDROCHLORIDE 500 MG/1
500 TABLET ORAL ONCE
COMMUNITY

## 2023-08-05 RX ORDER — BUTALBITAL, ACETAMINOPHEN AND CAFFEINE 50; 325; 40 MG/1; MG/1; MG/1
1 TABLET ORAL
Status: COMPLETED | OUTPATIENT
Start: 2023-08-05 | End: 2023-08-05

## 2023-08-05 RX ORDER — GADOBUTROL 604.72 MG/ML
10 INJECTION INTRAVENOUS
Status: COMPLETED | OUTPATIENT
Start: 2023-08-05 | End: 2023-08-05

## 2023-08-05 RX ORDER — IBUPROFEN 200 MG
24 TABLET ORAL
Status: DISCONTINUED | OUTPATIENT
Start: 2023-08-05 | End: 2023-08-09 | Stop reason: HOSPADM

## 2023-08-05 RX ORDER — ONDANSETRON 2 MG/ML
4 INJECTION INTRAMUSCULAR; INTRAVENOUS
Status: ACTIVE | OUTPATIENT
Start: 2023-08-05 | End: 2023-08-06

## 2023-08-05 RX ORDER — MORPHINE SULFATE 2 MG/ML
1 INJECTION, SOLUTION INTRAMUSCULAR; INTRAVENOUS EVERY 4 HOURS PRN
Status: DISCONTINUED | OUTPATIENT
Start: 2023-08-05 | End: 2023-08-09 | Stop reason: HOSPADM

## 2023-08-05 RX ORDER — IBUPROFEN 200 MG
16 TABLET ORAL
Status: DISCONTINUED | OUTPATIENT
Start: 2023-08-05 | End: 2023-08-09 | Stop reason: HOSPADM

## 2023-08-05 RX ORDER — ONDANSETRON 2 MG/ML
4 INJECTION INTRAMUSCULAR; INTRAVENOUS EVERY 6 HOURS PRN
Status: DISCONTINUED | OUTPATIENT
Start: 2023-08-05 | End: 2023-08-09 | Stop reason: HOSPADM

## 2023-08-05 RX ORDER — BISACODYL 10 MG
10 SUPPOSITORY, RECTAL RECTAL DAILY PRN
Status: DISCONTINUED | OUTPATIENT
Start: 2023-08-05 | End: 2023-08-09 | Stop reason: HOSPADM

## 2023-08-05 RX ADMIN — GADOBUTROL 10 ML: 604.72 INJECTION INTRAVENOUS at 09:08

## 2023-08-05 RX ADMIN — ONDANSETRON 4 MG: 4 TABLET, ORALLY DISINTEGRATING ORAL at 12:08

## 2023-08-05 RX ADMIN — BUTALBITAL, ACETAMINOPHEN, AND CAFFEINE 1 TABLET: 325; 50; 40 TABLET ORAL at 12:08

## 2023-08-05 NOTE — ED PROVIDER NOTES
Encounter Date: 8/5/2023       History     Chief Complaint   Patient presents with    Headache     Pt reports head pressure to the left side of her face that travels down the front left side of her body x1 week. Pt also reports some vomiting that started this morning. Hx of diabetes     22-year-old female presents emergency department with complaints of left-sided headache and pain that travels down her face and body.  Patient states that the symptoms began approximately 1 week ago.  Associated symptoms include vomiting that began this morning.  Patient denies any leg weakness, fever, chills, chest pain or any other symptoms at this time.    The history is provided by the patient.     Review of patient's allergies indicates:  No Known Allergies  No past medical history on file.  No past surgical history on file.  No family history on file.     Review of Systems   Constitutional:  Negative for fever.   HENT:  Negative for sore throat.    Respiratory:  Negative for shortness of breath.    Cardiovascular:  Negative for chest pain.   Gastrointestinal:  Positive for nausea and vomiting.   Genitourinary:  Negative for dysuria.   Musculoskeletal:  Negative for back pain.   Skin:  Negative for rash.   Neurological:  Positive for headaches. Negative for weakness.   Hematological:  Does not bruise/bleed easily.   All other systems reviewed and are negative.      Physical Exam     Initial Vitals [08/05/23 1240]   BP Pulse Resp Temp SpO2   136/74 82 18 98.2 °F (36.8 °C) 99 %      MAP       --         Physical Exam    Constitutional: She appears well-developed and well-nourished. She is not diaphoretic. No distress.   HENT:   Head: Normocephalic and atraumatic.   Eyes: Conjunctivae and EOM are normal. Pupils are equal, round, and reactive to light.   Neck: Neck supple.   Normal range of motion.  Cardiovascular:  Normal rate, regular rhythm and normal heart sounds.           No murmur heard.  Pulmonary/Chest: Breath sounds  normal. No respiratory distress. She has no wheezes. She has no rales.   Abdominal: Abdomen is soft. Bowel sounds are normal. There is no abdominal tenderness. There is no rebound and no guarding.   Musculoskeletal:         General: No tenderness or edema. Normal range of motion.      Cervical back: Normal range of motion and neck supple.     Neurological: She is alert and oriented to person, place, and time. She has normal strength. No cranial nerve deficit or sensory deficit. GCS score is 15. GCS eye subscore is 4. GCS verbal subscore is 5. GCS motor subscore is 6.   Skin: Skin is warm and dry. Capillary refill takes less than 2 seconds.   Psychiatric: She has a normal mood and affect. Thought content normal.         ED Course   Procedures  Labs Reviewed   URINALYSIS, REFLEX TO URINE CULTURE - Abnormal; Notable for the following components:       Result Value    Appearance, UA Hazy (*)     All other components within normal limits    Narrative:     Specimen Source->Urine   COMPREHENSIVE METABOLIC PANEL - Abnormal; Notable for the following components:    Sodium 135 (*)     CO2 16 (*)     All other components within normal limits   CBC W/ AUTO DIFFERENTIAL - Abnormal; Notable for the following components:    Hemoglobin 11.2 (*)     Hematocrit 35.7 (*)     MCH 26.4 (*)     MCHC 31.4 (*)     Immature Granulocytes 0.6 (*)     Immature Grans (Abs) 0.06 (*)     All other components within normal limits   PREGNANCY TEST, URINE RAPID    Narrative:     For women of childbearing age w/o hysterectomy  Specimen Source->Urine   POCT GLUCOSE   POCT GLUCOSE MONITORING CONTINUOUS          Imaging Results              CT Head Without Contrast (Final result)  Result time 08/05/23 13:33:15      Final result by Aashish Bermudez MD (08/05/23 13:33:15)                   Impression:      Dilated 3rd and lateral ventricles of uncertain etiology.  No intracranial hemorrhage.    All CT scans at this facility are performed  using dose  modulation techniques as appropriate to performed exam including the following:  automated exposure control; adjustment of mA and/or kV according to the patients size (this includes techniques or standardized protocols for targeted exams where dose is matched to indication/reason for exam: i.e. extremities or head);  iterative reconstruction technique.      Electronically signed by: Aashish Howards  Date:    08/05/2023  Time:    13:33               Narrative:    EXAMINATION:  CT HEAD WITHOUT CONTRAST    CLINICAL HISTORY:  Headache, sudden, severe;    TECHNIQUE:  Noncontrast CT head    COMPARISON:  None    FINDINGS:  No intracranial hemorrhage is identified.  There is dilation of the 3rd and lateral ventricles more pronounced on the right.  The 4th ventricle appears normal.  The calvarium is intact.  Visualized paranasal sinuses and mastoid air cells are clear.                                       Medications   butalbital-acetaminophen-caffeine -40 mg per tablet 1 tablet (1 tablet Oral Given 8/5/23 1255)   ondansetron disintegrating tablet 4 mg (4 mg Oral Given 8/5/23 1255)     Medical Decision Making:   Clinical Tests:   Lab Tests: Ordered and Reviewed  The following lab test(s) were unremarkable: CBC and CMP       <> Summary of Lab: Unremarkable  Radiological Study: Ordered and Reviewed  ED Management:  CT shows dilated ventricles, patient accepted at [facility] [address] for neurosurgical evaluation.             ED Course as of 08/05/23 1536   Sat Aug 05, 2023   1437 I have reviewed the notes, assessments, and/or procedures performed by Gen Pollard NP, I concur with her/his documentation of Earl Mcelroy. In addition, I performed face to face time with the patient. She is reporting a headache that has been constant weather sitting, laying, standing, and feels the same morning and night over the past 1 week. CT is showing suggestion of ventricular dilation. Will send for NSGY consultation.  Patient has no acute focal neurologic deficits on my exam.    [BA]      ED Course User Index  [BA] Bright Lees MD        3:35 PM: Consult with Dr. Johnson (Neurosurgery) at [facility] concerning pt. There are no neurosurgery services, which the patient requires, offered at [facility] at this time. Dr. Johnosn expresses understanding and will accept transfer.  Accepting Facility: [facility]  Accepting Physician: Elizabeth         Clinical Impression:   Final diagnoses:  [G91.9] Hydrocephalus, unspecified type (Primary)  [R51.9] Nonintractable headache, unspecified chronicity pattern, unspecified headache type        ED Disposition Condition    Transfer to Another Facility Stable                Avinash Pollard Jr., P  08/05/23 0192

## 2023-08-06 LAB
ANION GAP SERPL CALC-SCNC: 10 MMOL/L (ref 8–16)
BASOPHILS # BLD AUTO: 0.03 K/UL (ref 0–0.2)
BASOPHILS NFR BLD: 0.3 % (ref 0–1.9)
BUN SERPL-MCNC: 14 MG/DL (ref 6–20)
CALCIUM SERPL-MCNC: 9.2 MG/DL (ref 8.7–10.5)
CHLORIDE SERPL-SCNC: 104 MMOL/L (ref 95–110)
CO2 SERPL-SCNC: 23 MMOL/L (ref 23–29)
CREAT SERPL-MCNC: 0.9 MG/DL (ref 0.5–1.4)
DIFFERENTIAL METHOD: ABNORMAL
EOSINOPHIL # BLD AUTO: 0.1 K/UL (ref 0–0.5)
EOSINOPHIL NFR BLD: 0.9 % (ref 0–8)
ERYTHROCYTE [DISTWIDTH] IN BLOOD BY AUTOMATED COUNT: 13.8 % (ref 11.5–14.5)
EST. GFR  (NO RACE VARIABLE): >60 ML/MIN/1.73 M^2
GLUCOSE SERPL-MCNC: 86 MG/DL (ref 70–110)
HCT VFR BLD AUTO: 33.6 % (ref 37–48.5)
HGB BLD-MCNC: 10.7 G/DL (ref 12–16)
IMM GRANULOCYTES # BLD AUTO: 0.05 K/UL (ref 0–0.04)
IMM GRANULOCYTES NFR BLD AUTO: 0.4 % (ref 0–0.5)
LYMPHOCYTES # BLD AUTO: 2.8 K/UL (ref 1–4.8)
LYMPHOCYTES NFR BLD: 24.3 % (ref 18–48)
MCH RBC QN AUTO: 27 PG (ref 27–31)
MCHC RBC AUTO-ENTMCNC: 31.8 G/DL (ref 32–36)
MCV RBC AUTO: 85 FL (ref 82–98)
MONOCYTES # BLD AUTO: 1.2 K/UL (ref 0.3–1)
MONOCYTES NFR BLD: 10.6 % (ref 4–15)
NEUTROPHILS # BLD AUTO: 7.2 K/UL (ref 1.8–7.7)
NEUTROPHILS NFR BLD: 63.5 % (ref 38–73)
NRBC BLD-RTO: 0 /100 WBC
PLATELET # BLD AUTO: 251 K/UL (ref 150–450)
PMV BLD AUTO: 10.8 FL (ref 9.2–12.9)
POTASSIUM SERPL-SCNC: 4.2 MMOL/L (ref 3.5–5.1)
RBC # BLD AUTO: 3.97 M/UL (ref 4–5.4)
SODIUM SERPL-SCNC: 137 MMOL/L (ref 136–145)
WBC # BLD AUTO: 11.35 K/UL (ref 3.9–12.7)

## 2023-08-06 PROCEDURE — 63600175 PHARM REV CODE 636 W HCPCS: Performed by: STUDENT IN AN ORGANIZED HEALTH CARE EDUCATION/TRAINING PROGRAM

## 2023-08-06 PROCEDURE — 99223 1ST HOSP IP/OBS HIGH 75: CPT | Mod: ,,, | Performed by: NEUROLOGICAL SURGERY

## 2023-08-06 PROCEDURE — G0378 HOSPITAL OBSERVATION PER HR: HCPCS

## 2023-08-06 PROCEDURE — 36415 COLL VENOUS BLD VENIPUNCTURE: CPT | Performed by: STUDENT IN AN ORGANIZED HEALTH CARE EDUCATION/TRAINING PROGRAM

## 2023-08-06 PROCEDURE — 25000003 PHARM REV CODE 250: Performed by: STUDENT IN AN ORGANIZED HEALTH CARE EDUCATION/TRAINING PROGRAM

## 2023-08-06 PROCEDURE — 63600175 PHARM REV CODE 636 W HCPCS: Performed by: PHYSICIAN ASSISTANT

## 2023-08-06 PROCEDURE — 80048 BASIC METABOLIC PNL TOTAL CA: CPT | Performed by: STUDENT IN AN ORGANIZED HEALTH CARE EDUCATION/TRAINING PROGRAM

## 2023-08-06 PROCEDURE — 99223 PR INITIAL HOSPITAL CARE,LEVL III: ICD-10-PCS | Mod: ,,, | Performed by: NEUROLOGICAL SURGERY

## 2023-08-06 PROCEDURE — 11000001 HC ACUTE MED/SURG PRIVATE ROOM

## 2023-08-06 PROCEDURE — 96372 THER/PROPH/DIAG INJ SC/IM: CPT | Performed by: STUDENT IN AN ORGANIZED HEALTH CARE EDUCATION/TRAINING PROGRAM

## 2023-08-06 PROCEDURE — 85025 COMPLETE CBC W/AUTO DIFF WBC: CPT | Performed by: STUDENT IN AN ORGANIZED HEALTH CARE EDUCATION/TRAINING PROGRAM

## 2023-08-06 RX ORDER — TALC
6 POWDER (GRAM) TOPICAL NIGHTLY PRN
Status: DISCONTINUED | OUTPATIENT
Start: 2023-08-06 | End: 2023-08-09 | Stop reason: HOSPADM

## 2023-08-06 RX ORDER — TALC
POWDER (GRAM) TOPICAL
Status: DISPENSED
Start: 2023-08-06 | End: 2023-08-06

## 2023-08-06 RX ORDER — ENOXAPARIN SODIUM 100 MG/ML
40 INJECTION SUBCUTANEOUS EVERY 24 HOURS
Status: COMPLETED | OUTPATIENT
Start: 2023-08-06 | End: 2023-08-07

## 2023-08-06 RX ADMIN — MORPHINE SULFATE 1 MG: 2 INJECTION, SOLUTION INTRAMUSCULAR; INTRAVENOUS at 07:08

## 2023-08-06 RX ADMIN — MORPHINE SULFATE 1 MG: 2 INJECTION, SOLUTION INTRAMUSCULAR; INTRAVENOUS at 09:08

## 2023-08-06 RX ADMIN — ENOXAPARIN SODIUM 40 MG: 40 INJECTION SUBCUTANEOUS at 04:08

## 2023-08-06 RX ADMIN — Medication 6 MG: at 01:08

## 2023-08-06 RX ADMIN — MORPHINE SULFATE 1 MG: 2 INJECTION, SOLUTION INTRAMUSCULAR; INTRAVENOUS at 04:08

## 2023-08-06 RX ADMIN — SODIUM CHLORIDE: 9 INJECTION, SOLUTION INTRAVENOUS at 10:08

## 2023-08-06 RX ADMIN — SODIUM CHLORIDE: 9 INJECTION, SOLUTION INTRAVENOUS at 12:08

## 2023-08-06 NOTE — ASSESSMENT & PLAN NOTE
23 yo female with a PMH of migraines, DM presents due to concern for hydrocephalus after developing 2 weeks of headaches with 1 episode of vomiting. CT head and MRI brain obtained with mild aqueductal stenosis with culpocephaly; no intracranial hemorrhage.  MRI without enhancing mass lesion, no transependymal flow or sinus occlusion.     --Admit to neurosurgery  --Ophthalmology consult to evaluate for papillodema  --  MRI brain completed with stable ventricular enlargement, no other significant findings  --Q4 hour neurochecks  --ok for diet  -- FU preop labs  -- tentative plan for OR Tuesday for VPS  --MARICEL/SCD/lovenox    D/w staff, Dr. Johnson

## 2023-08-06 NOTE — PLAN OF CARE
Problem: Adult Inpatient Plan of Care  Goal: Plan of Care Review  Outcome: Ongoing, Progressing  Goal: Patient-Specific Goal (Individualized)  Outcome: Ongoing, Progressing  Goal: Absence of Hospital-Acquired Illness or Injury  Outcome: Ongoing, Progressing  Goal: Optimal Comfort and Wellbeing  Outcome: Ongoing, Progressing  Goal: Readiness for Transition of Care  Outcome: Ongoing, Progressing     Left headache still present, rated as 8/10, does not want morphine at this time. NPO since midnight as ordered. IVF. Family at the bedside. Will continue to monitor

## 2023-08-06 NOTE — H&P
[facility] - Emergency Dept  Neuorsurgery  History and Physical     Patient Name: Earl Mcelroy  MRN: [id number]  Admission Date: 8/5/2023  Attending Physician: Khoa Johnson MD   Primary Care Physician: Jalyn Castillo FNP    Patient information was obtained from ER records.     Subjective:     Chief Complaint/Reason for Admission: Headache     HPI:  Earl Mcelroy is a very pleasant 23 yo female with a PMH of DM, migranes who presents to the ED after headache for the past 2 weeks.  Her headache will be sharp and affect her forehead and spread to her left face. Tylenol and ibuprofen do not relieve symptoms. She came to the ED today after she had an episode of vomiting.  Of note no inciting symptoms, headaches can occur more at night, but may be sporadic.  She had a history of prematurity per her mother. She denies vision changes, seizure, weakness, numbness, tingling or other neurologic symptoms. Her migraines as a child were of much different intensity and severity.       (Not in a hospital admission)      Review of patient's allergies indicates:  No Known Allergies    History reviewed. No pertinent past medical history.  History reviewed. No pertinent surgical history.  Family History    None       Tobacco Use    Smoking status: Not on file    Smokeless tobacco: Not on file   Substance and Sexual Activity    Alcohol use: Not on file    Drug use: Not on file    Sexual activity: Not on file     Review of Systems  Objective:     Weight: 101.8 kg (224 lb 6.9 oz)  Body mass index is 38.52 kg/m².  Vital Signs (Most Recent):  Temp: 97.7 °F (36.5 °C) (08/05/23 2003)  Pulse: 80 (08/05/23 2103)  Resp: 17 (08/05/23 2103)  BP: 128/78 (08/05/23 2103)  SpO2: 97 % (08/05/23 2103) Vital Signs (24h Range):  Temp:  [97.7 °F (36.5 °C)-98.6 °F (37 °C)] 97.7 °F (36.5 °C)  Pulse:  [66-82] 80  Resp:  [16-18] 17  SpO2:  [97 %-100 %] 97 %  BP: (118-138)/(62-84) 128/78                                 Physical Exam    "      Neurosurgery Physical Exam  General: well developed, well nourished, no distress.   Head: normocephalic, atraumatic  Neurologic:   GCS: Eyes: 4/ Verbal: 5/ Motor: 6  Mental Status: Awake, Alert, Oriented x 3  Cranial nerves: PERRL, EOMI, face symmetric, tongue midline, shoulder shrug equal.  No pronator drift, no dysmetria.  Sensory: intact to light touch throughout  Motor Strength:Moves all extremities antigravity  DTR: 2+ symmetrically throughout.    Pulmonary: normal respirations, no signs of respiratory distress  Abdomen: soft, non-distended, not tender to palpation                     Significant Labs:  Recent Labs   Lab 08/05/23  1301   GLU 83   *   K 4.9      CO2 16*   BUN 10   CREATININE 0.8   CALCIUM 9.8     Recent Labs   Lab 08/05/23  1336   WBC 10.14   HGB 11.2*   HCT 35.7*        No results for input(s): "LABPT", "INR", "APTT" in the last 48 hours.  Microbiology Results (last 7 days)       ** No results found for the last 168 hours. **          All pertinent labs from the last 24 hours have been reviewed.    Significant Diagnostics:  I have reviewed and interpreted all pertinent imaging results/findings within the past 24 hours.    Assessment and Plan:     Aqueductal stenosis  23 yo female with a PMH of migraines, DM presents due to concern for hydrocephalus after developing 2 weeks of headaches with 1 episode of vomiting. CT head and MRI brain obtained with mild aqueductal stenosis with culpocephaly; no intracranial hemorrhage.  MRI without enhancing mass lesion, no transependymal flow or sinus occlusion.     --Admit to observation  --Ophthalmology consult to evaluate for papillodema  --Q4 hour neurochecks  --NPO after midnight    D/w staff, Dr. Elizabeth Velasco MD  Neurosurgery  [facility] - Emergency Dept    "

## 2023-08-06 NOTE — CONSULTS
"Consultation Report  Ophthalmology Service    Date: 08/06/2023    Reason for Consult: "Assess for papillodema; hydrocephalus"     History of Present Illness: Earl Mcelroy is a very pleasant 23 yo female with a PMH of DM, migranes who presents to the ED after headache for the past 2 weeks.  Her headache will be sharp and affect her forehead and spread to her left face. Tylenol and ibuprofen do not relieve symptoms. She came to the ED today after she had an episode of vomiting.  Of note no inciting symptoms, headaches can occur more at night, but may be sporadic.  She had a history of prematurity per her mother. She denies vision changes, seizure, weakness, numbness, tingling or other neurologic symptoms. Her migraines as a child were of much different intensity and severity. Ophthalmology is being consulted to evaluate for papilledema.      Patient denies any changes in vision, flashes, floaters, or curtain-veil in visual field ou. Denies any ocular discomfort ou.    POcularHx: Reports she was supposed to have a laser procedure in the past, unsure what for. Glasses. Denies any other history of ocular problems or past ocular surgeries.    Current eye gtts: Denies     Family Hx: Maternal GGM had glaucoma. Denies family history of macular degeneration, or blindness. family history is not on file.     PMHx:  has no past medical history on file.     PSurgHx:  has no past surgical history on file.     Home Medications:   Prior to Admission medications    Medication Sig Start Date End Date Taking? Authorizing Provider   diclofenac (VOLTAREN) 50 MG EC tablet Take 1 tablet (50 mg total) by mouth 3 (three) times daily as needed. 3/14/23   Tay Guerrero, UBALDO   metFORMIN (GLUCOPHAGE) 500 MG tablet Take 500 mg by mouth once.    Provider, Historical   ondansetron (ZOFRAN-ODT) 4 MG TbDL Take 1 tablet (4 mg total) by mouth every 8 (eight) hours as needed (vomiting). 4/13/23   Avinash Pollard Jr., [facility]        Medications this " encounter:    enoxparin  40 mg Subcutaneous Q24H (prophylaxis, 1700)    melatonin           Allergies: has No Known Allergies.     Social:       ROS: As per HPI    Ocular examination/Dilated fundus examination:  Base Eye Exam       Visual Acuity (Snellen - Linear)         Right Left    Dist sc 20/20 20/20              Tonometry (Tonopen, 12:18 PM)         Right Left    Pressure 12 13              Pupils         Pupils Dark Light Shape React APD    Right PERRL 3 2 Round Brisk None    Left PERRL 3 2 Round Brisk None              Visual Fields         Right Left     Full Full              Extraocular Movement         Right Left     Full, Ortho Full, Ortho              Dilation       Both eyes: 1% Tropicamide, 2.5% Phenylephrine, 2% Cyclopentolate @ 12:18 PM                  Slit Lamp and Fundus Exam       External Exam         Right Left    External Normal Normal              Slit Lamp Exam         Right Left    Lids/Lashes Normal Normal    Conjunctiva/Sclera White and quiet White and quiet    Cornea Clear Clear    Anterior Chamber Deep and formed Deep and formed    Iris Round and reactive Round and reactive    Lens Clear Clear    Anterior Vitreous Normal Normal              Fundus Exam         Right Left    Disc Sharp and pink Sharp and pink    C/D Ratio 0.3 0.3    Macula flat flat    Vessels Tortuous Tortuous    Periphery Flat, attached, Chorioretinal scars temporally, possible demarcation line. Flat, attached, no H/T/L                  MRI Brain w/wo  Impression:  1. 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  Further evaluation with CSF flow studies to evaluate for flow dynamics at level of the cerebral aqueduct is suggested.  2. No obstructing mass identified.  This report was flagged in Epic as abnormal.    Assessment/Plan:     1. Rule out Papilledema   - Pt w/ headache  - VA 20/20 OU, IOP wnl, PERRL no apd, EOMI, CVF and Color Plates full  - Anterior  Exam and DFE wnl without evidence of ONH edema/papilledema.   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   -Hx of obesity  -LP with opening pressure not performed  -MRI shows hydrocephalus with suspected mild narrowing of the caudal aspect of the cerebral aqueduct.   - No signs of vascular event on exam, IOP normal, no signs of active inflammation   - Further workup/management per primary team/Neuro/neurosurgery   - Will contact patient for follow up in Outpatient clinic for retinal scars  - Will schedule PT for visit with neuro ophthalmology for formal testing.   - Thank you for this interesting consult, Ophthalmology will sign off.     Patient's Best Contact Number: [phone]    Nikita Portillo MD   [facility] Ophthalmology PGY2  08/06/2023  12:19 PM        Common Ophthalmologic Abbreviations  OD: right eye  OS: left eye  OU: both eyes  IOP: intraocular pressure  VA: visual acuity  PH: pinhole  HM: hand motion  LP: light perception  NLP: no light perception  DFE: dilated fundus examination  SLE: slit lamp examination  RD: retinal detachment   AT: artificial tears  PFAT: preservative free artificial tears

## 2023-08-06 NOTE — PROGRESS NOTES
[facility] - Neurosurgery ([facility])  Neurosurgery  Progress Note    Subjective:     History of Present Illness: Earl Mcelroy is a very pleasant 23 yo female with a PMH of DM, migranes who presents to the ED after headache for the past 2 weeks.  Her headache will be sharp and affect her forehead and spread to her left face. Tylenol and ibuprofen do not relieve symptoms. She came to the ED today after she had an episode of vomiting. She denies vision changes, seizure, weakness, numbness, tingling or other neurologic symptoms. Her migraines as a child were of much different intensity and severity.       Post-Op Info:  Procedure(s) (LRB):  INSERTION, SHUNT, VENTRICULOPERITONEAL (Right)       No new subjective & objective note has been filed under this hospital service since the last note was generated.    Assessment/Plan:     Aqueductal stenosis  23 yo female with a PMH of migraines, DM presents due to concern for hydrocephalus after developing 2 weeks of headaches with 1 episode of vomiting. CT head and MRI brain obtained with mild aqueductal stenosis with culpocephaly; no intracranial hemorrhage.  MRI without enhancing mass lesion, no transependymal flow or sinus occlusion.     --Admit to neurosurgery  --Ophthalmology consult to evaluate for papillodema  --Q4 hour neurochecks  --ok for diet  -- FU preop labs  -- tentative plan for OR Tuesday for VPS  --MARICEL/SCD/lovenox    D/w staff, Dr. Elizabeth Chi MD  Neurosurgery  [facility] ([facility])

## 2023-08-06 NOTE — ED NOTES
Earl Mcelroy, a 22 y.o. female presents to the ED via EMS as transfer from [facility] w/ complaint of needing Neuro Consult following abnormal CT scan showing dilated 3rd ventricles ( worse on right).  Pt states began having  HA appx 2 weeks ago and has been having intermittent numbness and tingling on LUE/LLE since HA began. Reports no deficits. Moves all 4 extremities at baseline. Speech clear, face symmetrical, answers all questions correctly. Follows commands without difficulty. Pupils 3mm bilaterally with PERRLA intact. Reports mild-mod HA at this time with photophobia. 100% O2 on RA. AAOx4. Denies N/V and dizziness while at rest.     Triage note:  Chief Complaint   Patient presents with    transfer     Transfer from  for neurosx for HA, L sided weakness. Ct showing dilated ventricles     Review of patient's allergies indicates:  No Known Allergies  History reviewed. No pertinent past medical history.

## 2023-08-06 NOTE — SUBJECTIVE & OBJECTIVE
"(Not in a hospital admission)      Review of patient's allergies indicates:  No Known Allergies    History reviewed. No pertinent past medical history.  History reviewed. No pertinent surgical history.  Family History    None       Tobacco Use    Smoking status: Not on file    Smokeless tobacco: Not on file   Substance and Sexual Activity    Alcohol use: Not on file    Drug use: Not on file    Sexual activity: Not on file     Review of Systems  Objective:     Weight: 101.8 kg (224 lb 6.9 oz)  Body mass index is 38.52 kg/m².  Vital Signs (Most Recent):  Temp: 97.7 °F (36.5 °C) (08/05/23 2003)  Pulse: 80 (08/05/23 2103)  Resp: 17 (08/05/23 2103)  BP: 128/78 (08/05/23 2103)  SpO2: 97 % (08/05/23 2103) Vital Signs (24h Range):  Temp:  [97.7 °F (36.5 °C)-98.6 °F (37 °C)] 97.7 °F (36.5 °C)  Pulse:  [66-82] 80  Resp:  [16-18] 17  SpO2:  [97 %-100 %] 97 %  BP: (118-138)/(62-84) 128/78                                 Physical Exam         Neurosurgery Physical Exam  General: well developed, well nourished, no distress.   Head: normocephalic, atraumatic  Neurologic:   GCS: Eyes: 4/ Verbal: 5/ Motor: 6  Mental Status: Awake, Alert, Oriented x 3  Cranial nerves: PERRL, EOMI, face symmetric, tongue midline, shoulder shrug equal.  No pronator drift, no dysmetria.  Sensory: intact to light touch throughout  Motor Strength:Moves all extremities antigravity  DTR: 2+ symmetrically throughout.    Pulmonary: normal respirations, no signs of respiratory distress  Abdomen: soft, non-distended, not tender to palpation                     Significant Labs:  Recent Labs   Lab 08/05/23  1301   GLU 83   *   K 4.9      CO2 16*   BUN 10   CREATININE 0.8   CALCIUM 9.8     Recent Labs   Lab 08/05/23  1336   WBC 10.14   HGB 11.2*   HCT 35.7*        No results for input(s): "LABPT", "INR", "APTT" in the last 48 hours.  Microbiology Results (last 7 days)       ** No results found for the last 168 hours. **          All pertinent " labs from the last 24 hours have been reviewed.    Significant Diagnostics:  I have reviewed and interpreted all pertinent imaging results/findings within the past 24 hours.

## 2023-08-06 NOTE — NURSING
Nurses Note -- 4 Eyes      8/6/2023   1:38 AM      Skin assessed during: Admit      [x] No Altered Skin Integrity Present    [x]Prevention Measures Documented      [] Yes- Altered Skin Integrity Present or Discovered   [] LDA Added if Not in Epic (Describe Wound)   [] New Altered Skin Integrity was Present on Admit and Documented in LDA   [] Wound Image Taken    Wound Care Consulted? No    Attending Nurse:  Rae KLINE RN     Second RN/Staff Member:  YENNY Brownlee

## 2023-08-06 NOTE — DISCHARGE INSTRUCTIONS
Neurosurgery Patient Information  -Return to work will be determined on an individual basis.  -No driving while taking narcotic pain medication.   -Do not take any OTC products containing acetaminophen at the same time as you take your narcotic pain medication. Medications that may contain acetaminophen include but are not limited to: Excedrin and other headache medications, arthritis medications, cold and sinus medications, etc. Please review the list of active ingredients in any OTC medication prior to taking it.  -Do not take any Aspirin or Aspirin-containing products for 2 weeks after surgery.  -Do not take any Aleve, Naprosyn, Naproxen, Ibuprofen, Advil or any other nonsteroidal anti-inflammatory drug (NSAID) for 2 weeks after surgery.  -Do not take any herbal supplements for 2 weeks after surgery.   -Do not consume any alcoholic beverages until released by your neurosurgeon  -Do not perform any excessive bending over or leaning forward as this is a fall hazard.  -Do not perform any heavy lifting or lifting more than 5-10 lbs from the ground level as this is a fall hazard.  -Slowly increase your ambulation [walking] over the next 2 weeks as tolerated. The goal is to be walking 1-2 miles by the time of your 2 week post op appointment.   -Walk on paved surfaces only. It is okay to walk up and down stairs while holding onto a side rail.      Contact the Neurosurgery Office immediately if:  If you begin to notice any neurologic changes such as:           -Sudden onset of lethargy or sleepiness           -Sudden confusion, trouble speaking, or understanding            -Sudden trouble seeing in one or both eyes            -Sudden trouble walking, dizziness, loss of coordination            -Sudden severe headache with no known cause            -Sudden onset of numbness or weakness     Wound Care:  Keep your incision open to air. Keep incision clean and dry at all times. You may shower on the 2nd day after your  surgery. You may wash your hair with baby shampoo. Do not rub or scrub the incision. Do not allow the force of water to hit the incision. If the incision gets damp, gently pat it dry. You cannot take a bath/swim/submerge the incision until 8 weeks after surgery.    The incision does not need to be cleaned with any water, soap, alcohol, peroxide, or other substance.    Call your doctor or go to the Emergency Room for any signs of infection including: increased redness, drainage, pain or fever (temperature greater than or equal to 101.4).     Magnetic resonance imaging (MRI) and your shunt:  -If youre having an MRI scan, tell the MRI technologist you have a programmable  shunt before you have the scan. They will need to know your shunts model and setting. You have a Codman Hakim valve set at 100. The magnet in the MRI scanner may change your shunts pressure setting. After your MRI, your shunts pressure setting will need to be checked, reprogrammed, or both. You may need to have x-rays to help see if the pressure setting has changed.  -Before your MRI scan, make arrangements to have your shunt reprogrammed after your MRI scan. Your shunt should be reprogrammed within 4 hours after your MRI.    -You dont need to take any precautions if youre having a computed tomography (CT) scan or x-ray.      Miscellaneous:  -Follow up in neurosurgery clinic in 2 weeks for a wound check and repeat CT scan of your head. Appointment will be mailed to you.      [facility] Neurosurgery Office: [phone]

## 2023-08-06 NOTE — HOSPITAL COURSE
8/6 naeon, exam stable, nausea and pain controlled. MRI completed without significant findings or changes from [facility]. Ophtho consult today, Plan for OR Tuesday for VPS  8/7: No papilledema on ophtho exam. Plan for VPS placement tomorrow. NPO @ midnight. Cleared for surgery by HM.   8/8: NAEON. AFVSS. Neurologically stable. +flatus. Tolerating PO diet and voiding spontaneously. Medically stable to discharge home. Follow up in clinic in 2 weeks. Discharge instructions given verbally to patient. All of her questions were answered. She encouraged to call the clinic with any questions or concerns prior to follow up appt.     Physical exam 8/9:  General: well developed, well nourished, no distress.   Head: normocephalic, cranial incision C/D/I  Neurologic: Alert and oriented. Thought content appropriate.  GCS: Motor: 6/Verbal: 5/Eyes: 4 GCS Total: 15  Mental Status: Awake, Alert, Oriented x 4  Language: No aphasia  Speech: No dysarthria  Cranial nerves: face symmetric, tongue midline, CN II-XII grossly intact.   Eyes: pupils equal, round, reactive to light with accommodation, EOMI.   Pulmonary: normal respirations, no signs of respiratory distress  Abdomen: soft, non-distended, not tender to palpation  Skin: Skin is warm, dry and intact.  Sensory: intact to light touch throughout  Motor Strength:Moves all extremities spontaneously with good tone.  Full strength upper and lower extremities. No abnormal movements seen.

## 2023-08-06 NOTE — HPI
Earl Mcelroy is a very pleasant 21 yo female with a PMH of DM, migranes who presents to the ED after headache for the past 2 weeks.  Her headache will be sharp and affect her forehead and spread to her left face. Tylenol and ibuprofen do not relieve symptoms. She came to the ED today after she had an episode of vomiting. She denies vision changes, seizure, weakness, numbness, tingling or other neurologic symptoms. Her migraines as a child were of much different intensity and severity.

## 2023-08-06 NOTE — ED PROVIDER NOTES
Encounter Date: 8/5/2023       History     Chief Complaint   Patient presents with    transfer     Transfer from  for neurosx for HA, L sided weakness. Ct showing dilated ventricles     22-year-old female with distant history of migraine headaches in elementary school is presenting to the emergency department as transfer for neurosurgery evaluation for hydrocephalus.  Patient reports 2 weeks of intermittent headache.  Headache is left-sided and associated with left lower extremity numbness and weakness.  States she has been taking Motrin and Tylenol at home for symptoms.  Today she also had nausea with vomiting which is what prompted her to go to outside emergency department for evaluation.  She denies any fevers, cough, congestion, chest pain, shortness of breath.  She also notes left-sided abdominal pain, which she associates with her headache.  No changes in speech or vision.    The history is provided by the patient. No  was used.     Review of patient's allergies indicates:  No Known Allergies  History reviewed. No pertinent past medical history.  History reviewed. No pertinent surgical history.  No family history on file.     Review of Systems   Constitutional:         See HPI       Physical Exam     Initial Vitals   BP Pulse Resp Temp SpO2   08/05/23 2000 08/05/23 2000 08/05/23 2000 08/05/23 2003 08/05/23 2000   131/84 66 16 97.7 °F (36.5 °C) 100 %      MAP       --                Physical Exam    Nursing note and vitals reviewed.  Constitutional: She appears well-developed and well-nourished.   HENT:   Head: Normocephalic and atraumatic.   Eyes: Conjunctivae and EOM are normal. Pupils are equal, round, and reactive to light.   Neck: Neck supple.   Normal range of motion.  Cardiovascular:  Normal rate, regular rhythm, normal heart sounds and intact distal pulses.           Pulmonary/Chest: No respiratory distress. She has no wheezes. She has no rhonchi. She has no rales.   Abdominal:  Abdomen is soft. Bowel sounds are normal. She exhibits no distension. There is no abdominal tenderness. There is no rebound and no guarding.   Musculoskeletal:         General: No tenderness or edema. Normal range of motion.      Cervical back: Normal range of motion and neck supple.     Neurological: She is alert and oriented to person, place, and time.   4/5 strength with lifting L leg from bed. 5/5 strength in BUE and RLE.  No dysmetria with finger-to-nose   Skin: Skin is warm and dry.   Psychiatric: She has a normal mood and affect. Thought content normal.         ED Course   Procedures  Labs Reviewed - No data to display       Imaging Results              MRI Brain W WO Contrast (In process)  Result time 08/05/23 21:11:30                     Medications   morphine injection 6 mg (0 mg Intravenous Hold 8/5/23 2045)   ondansetron injection 4 mg (0 mg Intravenous Hold 8/5/23 2045)   gadobutroL (GADAVIST) injection 10 mL (10 mLs Intravenous Given 8/5/23 2124)     Medical Decision Making:   History:   Old Medical Records: I decided to obtain old medical records.  Old Records Summarized: records from previous admission(s).  Initial Assessment:   22-year-old female with distant history of migraine headaches in elementary school is presenting to the emergency department as transfer for neurosurgery evaluation for hydrocephalus.  Differential Diagnosis:   Malignancy, normal pressure hydrocephalus, doubt meningitis  Clinical Tests:   Radiological Study: Ordered  ED Management:  MRI is pending at the time of sign-out.  Awaiting further recommendation from Neurosurgery.  This will be signed out to oncoming team.  Anticipate patient coming into the hospital for further monitoring and treatment.  Patient is stable at the time of sign-out.             ED Course as of 08/05/23 2208   Sat Aug 05, 2023   2015 Discussed with neurosurgery.  They recommended MRI brain with and without contrast. [KL]      ED Course User Index  [KL]  Belgica Oquendo MD                 Clinical Impression:   Final diagnoses:  [R51.9] Nonintractable headache, unspecified chronicity pattern, unspecified headache type (Primary)  [G91.9] Hydrocephalus, unspecified type               Belgica Oquendo MD  Resident  08/05/23 8172

## 2023-08-06 NOTE — ASSESSMENT & PLAN NOTE
23 yo female with a PMH of migraines, DM presents due to concern for hydrocephalus after developing 2 weeks of headaches with 1 episode of vomiting. CT head and MRI brain obtained with mild aqueductal stenosis with culpocephaly; no intracranial hemorrhage.  MRI without enhancing mass lesion, no transependymal flow or sinus occlusion.     --Admit to observation  --Ophthalmology consult to evaluate for papillodema  --Q4 hour neurochecks  --NPO after midnight    D/w staff, Dr. Johnson

## 2023-08-07 ENCOUNTER — TELEPHONE (OUTPATIENT)
Dept: OPHTHALMOLOGY | Facility: CLINIC | Age: 23
End: 2023-08-07
Payer: MEDICAID

## 2023-08-07 ENCOUNTER — ANESTHESIA EVENT (OUTPATIENT)
Dept: SURGERY | Facility: HOSPITAL | Age: 23
DRG: 032 | End: 2023-08-07
Payer: MEDICAID

## 2023-08-07 LAB
ABO + RH BLD: NORMAL
ANION GAP SERPL CALC-SCNC: 8 MMOL/L (ref 8–16)
APTT PPP: 27.9 SEC (ref 21–32)
BASOPHILS # BLD AUTO: 0.03 K/UL (ref 0–0.2)
BASOPHILS NFR BLD: 0.3 % (ref 0–1.9)
BLD GP AB SCN CELLS X3 SERPL QL: NORMAL
BUN SERPL-MCNC: 12 MG/DL (ref 6–20)
CALCIUM SERPL-MCNC: 8.5 MG/DL (ref 8.7–10.5)
CHLORIDE SERPL-SCNC: 108 MMOL/L (ref 95–110)
CO2 SERPL-SCNC: 21 MMOL/L (ref 23–29)
CREAT SERPL-MCNC: 0.8 MG/DL (ref 0.5–1.4)
DIFFERENTIAL METHOD: ABNORMAL
EOSINOPHIL # BLD AUTO: 0.1 K/UL (ref 0–0.5)
EOSINOPHIL NFR BLD: 0.9 % (ref 0–8)
ERYTHROCYTE [DISTWIDTH] IN BLOOD BY AUTOMATED COUNT: 13.7 % (ref 11.5–14.5)
EST. GFR  (NO RACE VARIABLE): >60 ML/MIN/1.73 M^2
GLUCOSE SERPL-MCNC: 93 MG/DL (ref 70–110)
HCT VFR BLD AUTO: 31.3 % (ref 37–48.5)
HGB BLD-MCNC: 9.9 G/DL (ref 12–16)
IMM GRANULOCYTES # BLD AUTO: 0.04 K/UL (ref 0–0.04)
IMM GRANULOCYTES NFR BLD AUTO: 0.4 % (ref 0–0.5)
INR PPP: 1 (ref 0.8–1.2)
LYMPHOCYTES # BLD AUTO: 2.8 K/UL (ref 1–4.8)
LYMPHOCYTES NFR BLD: 28.7 % (ref 18–48)
MCH RBC QN AUTO: 27.2 PG (ref 27–31)
MCHC RBC AUTO-ENTMCNC: 31.6 G/DL (ref 32–36)
MCV RBC AUTO: 86 FL (ref 82–98)
MONOCYTES # BLD AUTO: 0.8 K/UL (ref 0.3–1)
MONOCYTES NFR BLD: 8.7 % (ref 4–15)
NEUTROPHILS # BLD AUTO: 5.9 K/UL (ref 1.8–7.7)
NEUTROPHILS NFR BLD: 61 % (ref 38–73)
NRBC BLD-RTO: 0 /100 WBC
PLATELET # BLD AUTO: 225 K/UL (ref 150–450)
PMV BLD AUTO: 10.4 FL (ref 9.2–12.9)
POTASSIUM SERPL-SCNC: 3.8 MMOL/L (ref 3.5–5.1)
PROTHROMBIN TIME: 10.9 SEC (ref 9–12.5)
RBC # BLD AUTO: 3.64 M/UL (ref 4–5.4)
SODIUM SERPL-SCNC: 137 MMOL/L (ref 136–145)
SPECIMEN OUTDATE: NORMAL
WBC # BLD AUTO: 9.62 K/UL (ref 3.9–12.7)

## 2023-08-07 PROCEDURE — 63600175 PHARM REV CODE 636 W HCPCS: Performed by: PHYSICIAN ASSISTANT

## 2023-08-07 PROCEDURE — 80048 BASIC METABOLIC PNL TOTAL CA: CPT | Performed by: STUDENT IN AN ORGANIZED HEALTH CARE EDUCATION/TRAINING PROGRAM

## 2023-08-07 PROCEDURE — 85730 THROMBOPLASTIN TIME PARTIAL: CPT | Performed by: PHYSICIAN ASSISTANT

## 2023-08-07 PROCEDURE — 86900 BLOOD TYPING SEROLOGIC ABO: CPT | Performed by: PHYSICIAN ASSISTANT

## 2023-08-07 PROCEDURE — 96372 THER/PROPH/DIAG INJ SC/IM: CPT | Performed by: PHYSICIAN ASSISTANT

## 2023-08-07 PROCEDURE — 36415 COLL VENOUS BLD VENIPUNCTURE: CPT | Performed by: PHYSICIAN ASSISTANT

## 2023-08-07 PROCEDURE — 36415 COLL VENOUS BLD VENIPUNCTURE: CPT | Performed by: STUDENT IN AN ORGANIZED HEALTH CARE EDUCATION/TRAINING PROGRAM

## 2023-08-07 PROCEDURE — 85025 COMPLETE CBC W/AUTO DIFF WBC: CPT | Performed by: STUDENT IN AN ORGANIZED HEALTH CARE EDUCATION/TRAINING PROGRAM

## 2023-08-07 PROCEDURE — 85610 PROTHROMBIN TIME: CPT | Performed by: PHYSICIAN ASSISTANT

## 2023-08-07 PROCEDURE — 11000001 HC ACUTE MED/SURG PRIVATE ROOM

## 2023-08-07 PROCEDURE — 36415 COLL VENOUS BLD VENIPUNCTURE: CPT | Performed by: NEUROLOGICAL SURGERY

## 2023-08-07 PROCEDURE — 25000003 PHARM REV CODE 250: Performed by: STUDENT IN AN ORGANIZED HEALTH CARE EDUCATION/TRAINING PROGRAM

## 2023-08-07 PROCEDURE — 99233 PR SUBSEQUENT HOSPITAL CARE,LEVL III: ICD-10-PCS | Mod: ,,, | Performed by: PHYSICIAN ASSISTANT

## 2023-08-07 PROCEDURE — 99233 SBSQ HOSP IP/OBS HIGH 50: CPT | Mod: ,,, | Performed by: PHYSICIAN ASSISTANT

## 2023-08-07 PROCEDURE — 63600175 PHARM REV CODE 636 W HCPCS: Performed by: STUDENT IN AN ORGANIZED HEALTH CARE EDUCATION/TRAINING PROGRAM

## 2023-08-07 RX ADMIN — MORPHINE SULFATE 1 MG: 2 INJECTION, SOLUTION INTRAMUSCULAR; INTRAVENOUS at 08:08

## 2023-08-07 RX ADMIN — ENOXAPARIN SODIUM 40 MG: 40 INJECTION SUBCUTANEOUS at 04:08

## 2023-08-07 RX ADMIN — ACETAMINOPHEN 650 MG: 325 TABLET ORAL at 07:08

## 2023-08-07 NOTE — SUBJECTIVE & OBJECTIVE
Interval History: No papilledema on ophtho exam. Plan for VPS placement tomorrow. NPO @ midnight.    Medications:  Continuous Infusions:   sodium chloride 0.9% 100 mL/hr at 08/06/23 2213     Scheduled Meds:   enoxparin  40 mg Subcutaneous Q24H (prophylaxis, 1700)     PRN Meds:acetaminophen, bisacodyL, dextrose 10 % in water (D10W), glucose, glucose, insulin aspart U-100, melatonin, morphine, ondansetron     Review of Systems  Objective:     Weight: 107.7 kg (237 lb 7 oz)  Body mass index is 40.76 kg/m².  Vital Signs (Most Recent):  Temp: 97.7 °F (36.5 °C) (08/07/23 1601)  Pulse: 72 (08/07/23 1601)  Resp: 20 (08/07/23 1601)  BP: 113/60 (08/07/23 1601)  SpO2: 96 % (08/07/23 1601) Vital Signs (24h Range):  Temp:  [97.7 °F (36.5 °C)-98.6 °F (37 °C)] 97.7 °F (36.5 °C)  Pulse:  [69-80] 72  Resp:  [15-20] 20  SpO2:  [96 %-99 %] 96 %  BP: ()/(54-74) 113/60     Date 08/07/23 0700 - 08/08/23 0659   Shift 6015-5259 2084-4317 3002-6295 24 Hour Total   INTAKE   I.V.(mL/kg)  1100(10.2)  1100(10.2)   Shift Total(mL/kg)  1100(10.2)  1100(10.2)   OUTPUT   Shift Total(mL/kg)       Weight (kg) 107.7 107.7 107.7 107.7       Neurosurgery Physical Exam  General: well developed, well nourished, no distress.   Head: normocephalic, atraumatic  Neurologic: Alert and oriented. Thought content appropriate.  GCS: Motor: 6/Verbal: 5/Eyes: 4 GCS Total: 15  Mental Status: Awake, Alert, Oriented x 4  Language: No aphasia  Speech: No dysarthria  Cranial nerves: face symmetric, tongue midline, CN II-XII grossly intact.   Eyes: pupils equal, round, reactive to light with accommodation, EOMI.   Pulmonary: normal respirations, no signs of respiratory distress  Skin: Skin is warm, dry and intact.  Sensory: intact to light touch throughout  Motor Strength:Moves all extremities spontaneously with good tone.  Full strength upper and lower extremities. No abnormal movements seen.           Significant Labs:  Recent Labs   Lab 08/06/23  0608  08/07/23  0455   GLU 86 93    137   K 4.2 3.8    108   CO2 23 21*   BUN 14 12   CREATININE 0.9 0.8   CALCIUM 9.2 8.5*     Recent Labs   Lab 08/06/23  0608 08/07/23  0455   WBC 11.35 9.62   HGB 10.7* 9.9*   HCT 33.6* 31.3*    225     Recent Labs   Lab 08/07/23  1102   INR 1.0   APTT 27.9     Microbiology Results (last 7 days)       ** No results found for the last 168 hours. **          All pertinent labs from the last 24 hours have been reviewed.    Significant Diagnostics:  I have reviewed all pertinent imaging results/findings within the past 24 hours.

## 2023-08-07 NOTE — PROGRESS NOTES
[facility] - Neurosurgery ([facility])  Neurosurgery  Progress Note    Subjective:     History of Present Illness: Earl Mcelroy is a very pleasant 23 yo female with a PMH of DM, migranes who presents to the ED after headache for the past 2 weeks.  Her headache will be sharp and affect her forehead and spread to her left face. Tylenol and ibuprofen do not relieve symptoms. She came to the ED today after she had an episode of vomiting. She denies vision changes, seizure, weakness, numbness, tingling or other neurologic symptoms. Her migraines as a child were of much different intensity and severity.       Post-Op Info:  Procedure(s) (LRB):  INSERTION, SHUNT, VENTRICULOPERITONEAL (Right)         Interval History: No papilledema on ophtho exam. Plan for VPS placement tomorrow. NPO @ midnight.    Medications:  Continuous Infusions:   sodium chloride 0.9% 100 mL/hr at 08/06/23 2213     Scheduled Meds:   enoxparin  40 mg Subcutaneous Q24H (prophylaxis, 1700)     PRN Meds:acetaminophen, bisacodyL, dextrose 10 % in water (D10W), glucose, glucose, insulin aspart U-100, melatonin, morphine, ondansetron     Review of Systems  Objective:     Weight: 107.7 kg (237 lb 7 oz)  Body mass index is 40.76 kg/m².  Vital Signs (Most Recent):  Temp: 97.7 °F (36.5 °C) (08/07/23 1601)  Pulse: 72 (08/07/23 1601)  Resp: 20 (08/07/23 1601)  BP: 113/60 (08/07/23 1601)  SpO2: 96 % (08/07/23 1601) Vital Signs (24h Range):  Temp:  [97.7 °F (36.5 °C)-98.6 °F (37 °C)] 97.7 °F (36.5 °C)  Pulse:  [69-80] 72  Resp:  [15-20] 20  SpO2:  [96 %-99 %] 96 %  BP: ()/(54-74) 113/60     Date 08/07/23 0700 - 08/08/23 0659   Shift 9931-4155 9635-8096 1807-7638 24 Hour Total   INTAKE   I.V.(mL/kg)  1100(10.2)  1100(10.2)   Shift Total(mL/kg)  1100(10.2)  1100(10.2)   OUTPUT   Shift Total(mL/kg)       Weight (kg) 107.7 107.7 107.7 107.7       Neurosurgery Physical Exam  General: well developed, well nourished, no distress.   Head: normocephalic,  atraumatic  Neurologic: Alert and oriented. Thought content appropriate.  GCS: Motor: 6/Verbal: 5/Eyes: 4 GCS Total: 15  Mental Status: Awake, Alert, Oriented x 4  Language: No aphasia  Speech: No dysarthria  Cranial nerves: face symmetric, tongue midline, CN II-XII grossly intact.   Eyes: pupils equal, round, reactive to light with accommodation, EOMI.   Pulmonary: normal respirations, no signs of respiratory distress  Skin: Skin is warm, dry and intact.  Sensory: intact to light touch throughout  Motor Strength:Moves all extremities spontaneously with good tone.  Full strength upper and lower extremities. No abnormal movements seen.           Significant Labs:  Recent Labs   Lab 08/06/23  0608 08/07/23  0455   GLU 86 93    137   K 4.2 3.8    108   CO2 23 21*   BUN 14 12   CREATININE 0.9 0.8   CALCIUM 9.2 8.5*     Recent Labs   Lab 08/06/23  0608 08/07/23  0455   WBC 11.35 9.62   HGB 10.7* 9.9*   HCT 33.6* 31.3*    225     Recent Labs   Lab 08/07/23  1102   INR 1.0   APTT 27.9     Microbiology Results (last 7 days)       ** No results found for the last 168 hours. **          All pertinent labs from the last 24 hours have been reviewed.    Significant Diagnostics:  I have reviewed all pertinent imaging results/findings within the past 24 hours.    Assessment/Plan:     Aqueductal stenosis  23 yo female with a PMH of migraines, DM presents due to concern for hydrocephalus after developing 2 weeks of headaches with 1 episode of vomiting. CT head and MRI brain obtained with mild aqueductal stenosis with culpocephaly; no intracranial hemorrhage.  MRI without enhancing mass lesion, no transependymal flow or sinus occlusion.     --Admit to neurosurgery  --Ophthalmology consult- no papilledema   --MRI brain completed with stable ventricular enlargement, no other significant findings  --Q4 hour neurochecks  --NPO @ midnight  --OR tomorrow for VPS  --MARICEL/SCD/lovenox    D/w staff, Dr. Elizabeth Verma  HEIDY Tripathi  Neurosurgery  [facility] - Neurosurgery ([facility])

## 2023-08-07 NOTE — TELEPHONE ENCOUNTER
Spoke with pt she will call back to schedule f/u appt for 3-4 weeks, pt need to check her schedule.   ----- Message from Nikita Portillo MD sent at 8/6/2023 12:29 PM CDT -----  Regarding: ED follow up  Hi,     Can we please get this patient an appointment in [facility] in 3-4wks for fu of CRS in PT with HTN/T2DM being worked up for hydrocephalus?     Thank you,    [facility] contact number: [phone]    Nikita Portillo MD

## 2023-08-07 NOTE — ASSESSMENT & PLAN NOTE
21 yo female with a PMH of migraines, DM presents due to concern for hydrocephalus after developing 2 weeks of headaches with 1 episode of vomiting. CT head and MRI brain obtained with mild aqueductal stenosis with culpocephaly; no intracranial hemorrhage.  MRI without enhancing mass lesion, no transependymal flow or sinus occlusion.     --Admit to neurosurgery  --Ophthalmology consult- no papilledema   --MRI brain completed with stable ventricular enlargement, no other significant findings  --Q4 hour neurochecks  --NPO @ midnight  --OR tomorrow for VPS  --MARICEL/SCD/lovenox    D/w staff, Dr. Johnson

## 2023-08-07 NOTE — ANESTHESIA PREPROCEDURE EVALUATION
[facility]  Anesthesia Pre-Operative Evaluation   08/07/2023        Earl Mcelroy, 2000  [id number]  Procedure(s) (LRB):  INSERTION, SHUNT, VENTRICULOPERITONEAL (Right)    Subjective    Earl Mcelroy is a 22 y.o. female w/ a significant PMHx of DM, migraines, MO (BMI 40), and aqueductal stenosis who presented to the ED for headaches and vomiting who was found to have hydrocephalus. MRI shows mild aqueductal stenosis. Hgb 9.9.    Patient now presents for above procedure(s)    Prev Airway: None documented.    LDA:        Peripheral IV - Single Lumen 08/07/23 0038 20 G Anterior;Right Forearm (Active)   Site Assessment Clean;Dry;Intact 08/07/23 0400   Line Status Blood return noted;Flushed;Infusing 08/07/23 0400   Dressing Status Clean;Dry;Intact 08/07/23 0400   Dressing Intervention Integrity maintained 08/07/23 0400   Number of days: 0       Drips   sodium chloride 0.9% 100 mL/hr at 08/06/23 2213       Patient Active Problem List   Diagnosis    Aqueductal stenosis       Review of patient's allergies indicates:  No Known Allergies    Current Inpatient Medications:    enoxparin  40 mg Subcutaneous Q24H (prophylaxis, 1700)       No current facility-administered medications on file prior to encounter.     Current Outpatient Medications on File Prior to Encounter   Medication Sig Dispense Refill    diclofenac (VOLTAREN) 50 MG EC tablet Take 1 tablet (50 mg total) by mouth 3 (three) times daily as needed. 15 tablet 0    metFORMIN (GLUCOPHAGE) 500 MG tablet Take 500 mg by mouth once.      ondansetron (ZOFRAN-ODT) 4 MG TbDL Take 1 tablet (4 mg total) by mouth every 8 (eight) hours as needed (vomiting). 12 tablet 0       History reviewed. No pertinent surgical history.    Social History:  Tobacco Use: Not on file       Alcohol Use: Not on file       Objective    Vital Signs Range:  BMI Readings from Last 1 Encounters:   08/05/23 40.76 kg/m²       Temp:  [36.9 °C (98.4 °F)-37 °C (98.6  °F)]   Pulse:  [69-74]   Resp:  [15-18]   BP: ()/(55-74)   SpO2:  [97 %-99 %]        Significant Labs:        Component Value Date/Time    WBC 9.62 08/07/2023 0455    HGB 9.9 (L) 08/07/2023 0455    HCT 31.3 (L) 08/07/2023 0455     08/07/2023 0455     08/07/2023 0455    K 3.8 08/07/2023 0455     08/07/2023 0455    CO2 21 (L) 08/07/2023 0455    GLU 93 08/07/2023 0455    BUN 12 08/07/2023 0455    CREATININE 0.8 08/07/2023 0455    CALCIUM 8.5 (L) 08/07/2023 0455    ALBUMIN 3.8 08/05/2023 1301    PROT 8.1 08/05/2023 1301    ALKPHOS 81 08/05/2023 1301    BILITOT 0.8 08/05/2023 1301    AST 27 08/05/2023 1301    ALT 11 08/05/2023 1301        Please see Results Review for additional labs.     Diagnostic Studies: All relevant studies, reviewed.      EKG:   No results found for this or any previous visit.    ECHO:  No results found for this or any previous visit.        Pre-op Assessment    I have reviewed the Patient Summary Reports.     I have reviewed the Nursing Notes. I have reviewed the NPO Status.   I have reviewed the Medications.     Review of Systems  Anesthesia Hx:  No problems with previous Anesthesia  Neg history of prior surgery. Denies Family Hx of Anesthesia complications.   Denies Personal Hx of Anesthesia complications.   Social:  Non-Smoker    Hematology/Oncology:  Hematology Normal   Oncology Normal    -- Denies Anemia:    EENT/Dental:EENT/Dental Normal   Cardiovascular:  Cardiovascular Normal  Denies Hypertension.  Denies MI.   Denies CABG/stent.   Denies CHF. ECG has been reviewed.    Pulmonary:  Pulmonary Normal  Denies COPD.  Denies Asthma.    Renal/:  Renal/ Normal  Denies Chronic Renal Disease.     Hepatic/GI:  Hepatic/GI Normal  Denies GERD. Denies Liver Disease.    Musculoskeletal:  Musculoskeletal Normal    Neurological:  Neurology Normal  Denies CVA. Headaches Denies Seizures.    Endocrine:  Endocrine Normal    Dermatological:  Skin Normal    Psych:  Psychiatric Normal            Physical Exam  General: Well nourished    Airway:  Mallampati: II   Mouth Opening: Normal  TM Distance: Normal  Tongue: Large  Neck ROM: Normal ROM    Dental:  Intact    Chest/Lungs:  Clear to auscultation    Heart:  Rate: Normal  Rhythm: Regular Rhythm    Abdomen:  Normal, Soft, Nontender        Anesthesia Plan  Type of Anesthesia, risks & benefits discussed:    Anesthesia Type: Gen ETT  Intra-op Monitoring Plan: Standard ASA Monitors  Post Op Pain Control Plan: multimodal analgesia and IV/PO Opioids PRN  Induction:  IV  Airway Plan: Direct and Video  Informed Consent: Informed consent signed with the Patient and all parties understand the risks and agree with anesthesia plan.  All questions answered. Patient consented to blood products? Yes  ASA Score: 3  Day of Surgery Review of History & Physical: H&P Update referred to the surgeon/provider.    Ready For Surgery From Anesthesia Perspective.     .

## 2023-08-07 NOTE — NURSING
Problem: Adult Inpatient Plan of Care  Goal: Plan of Care Review  Outcome: Ongoing, Progressing  Goal: Patient-Specific Goal (Individualized)  Outcome: Ongoing, Progressing  Goal: Absence of Hospital-Acquired Illness or Injury  Outcome: Ongoing, Progressing  Goal: Optimal Comfort and Wellbeing  Outcome: Ongoing, Progressing  Goal: Readiness for Transition of Care  Outcome: Ongoing, Progressing     Problem: Bariatric Environmental Safety  Goal: Safety Maintained with Care  Outcome: Ongoing, Progressing    Pt slept well this shift. A&Ox4. On RA. VSS. Family at bedside. Up independently.  Pain meds given per MAR. Safety precautions in place. Call light and personal items in reach. No further concerns noted at this time. NADN this shift. Will continue to monitor.

## 2023-08-07 NOTE — PLAN OF CARE
[facility] (Hospital)  Discharge Assessment    Primary Care Provider: Jalyn Castillo FNP     Discharge Assessment (most recent)       BRIEF DISCHARGE ASSESSMENT - 08/06/23 1503          Discharge Planning    Assessment Type Discharge Planning Brief Assessment     Resource/Environmental Concerns none     Support Systems Spouse/significant other     Equipment Currently Used at Home none     Current Living Arrangements apartment     Patient/Family Anticipates Transition to home with help/services     Patient/Family Anticipated Services at Transition home health care     DME Needed Upon Discharge  other (see comments)   TBD    Discharge Plan A Home Health;Home     Discharge Plan B Home        Stress    Do you feel stress - tense, restless, nervous, or anxious, or unable to sleep at night because your mind is troubled all the time - these days? To some extent        Social Connections    In a typical week, how many times do you talk on the phone with family, friends, or neighbors? More than three times a week     Are you , , , , never , or living with a partner? Living with partner                 Completed discharge assessment with patient at bedside. Explain CM services and answered all questions. Patient verbalized SO and his mother both work outside the home and may need HH at discharge. Verified demographics and payor information. PTA patient was independent with mobility and ADL's. Works full time and attends school. Concerned has to give school or work after this admission. CM/SW staff following.     Michelle Aguilar RN  Weekend  - [facility] [address]  Spectralink: [phone]

## 2023-08-08 ENCOUNTER — ANESTHESIA (OUTPATIENT)
Dept: SURGERY | Facility: HOSPITAL | Age: 23
DRG: 032 | End: 2023-08-08
Payer: MEDICAID

## 2023-08-08 LAB
ANION GAP SERPL CALC-SCNC: 11 MMOL/L (ref 8–16)
BASOPHILS # BLD AUTO: 0.04 K/UL (ref 0–0.2)
BASOPHILS NFR BLD: 0.4 % (ref 0–1.9)
BUN SERPL-MCNC: 10 MG/DL (ref 6–20)
CALCIUM SERPL-MCNC: 9 MG/DL (ref 8.7–10.5)
CHLORIDE SERPL-SCNC: 107 MMOL/L (ref 95–110)
CO2 SERPL-SCNC: 20 MMOL/L (ref 23–29)
CREAT SERPL-MCNC: 0.8 MG/DL (ref 0.5–1.4)
DIFFERENTIAL METHOD: ABNORMAL
EOSINOPHIL # BLD AUTO: 0.1 K/UL (ref 0–0.5)
EOSINOPHIL NFR BLD: 1.2 % (ref 0–8)
ERYTHROCYTE [DISTWIDTH] IN BLOOD BY AUTOMATED COUNT: 13.8 % (ref 11.5–14.5)
EST. GFR  (NO RACE VARIABLE): >60 ML/MIN/1.73 M^2
GLUCOSE SERPL-MCNC: 87 MG/DL (ref 70–110)
HCT VFR BLD AUTO: 32.3 % (ref 37–48.5)
HGB BLD-MCNC: 10.3 G/DL (ref 12–16)
IMM GRANULOCYTES # BLD AUTO: 0.04 K/UL (ref 0–0.04)
IMM GRANULOCYTES NFR BLD AUTO: 0.4 % (ref 0–0.5)
LYMPHOCYTES # BLD AUTO: 3 K/UL (ref 1–4.8)
LYMPHOCYTES NFR BLD: 30.5 % (ref 18–48)
MCH RBC QN AUTO: 27.5 PG (ref 27–31)
MCHC RBC AUTO-ENTMCNC: 31.9 G/DL (ref 32–36)
MCV RBC AUTO: 86 FL (ref 82–98)
MONOCYTES # BLD AUTO: 1.1 K/UL (ref 0.3–1)
MONOCYTES NFR BLD: 10.7 % (ref 4–15)
NEUTROPHILS # BLD AUTO: 5.7 K/UL (ref 1.8–7.7)
NEUTROPHILS NFR BLD: 56.8 % (ref 38–73)
NRBC BLD-RTO: 0 /100 WBC
PLATELET # BLD AUTO: 224 K/UL (ref 150–450)
PMV BLD AUTO: 11.4 FL (ref 9.2–12.9)
POCT GLUCOSE: 125 MG/DL (ref 70–110)
POTASSIUM SERPL-SCNC: 4.3 MMOL/L (ref 3.5–5.1)
RBC # BLD AUTO: 3.75 M/UL (ref 4–5.4)
SODIUM SERPL-SCNC: 138 MMOL/L (ref 136–145)
WBC # BLD AUTO: 9.94 K/UL (ref 3.9–12.7)

## 2023-08-08 PROCEDURE — 11000001 HC ACUTE MED/SURG PRIVATE ROOM

## 2023-08-08 PROCEDURE — 27800903 OPTIME MED/SURG SUP & DEVICES OTHER IMPLANTS: Performed by: NEUROLOGICAL SURGERY

## 2023-08-08 PROCEDURE — 25000003 PHARM REV CODE 250: Performed by: NEUROLOGICAL SURGERY

## 2023-08-08 PROCEDURE — 61781 PR STEREOTACTIC COMP ASSIST PROC,CRANIAL,INTRADURAL: ICD-10-PCS | Mod: ,,, | Performed by: NEUROLOGICAL SURGERY

## 2023-08-08 PROCEDURE — 94761 N-INVAS EAR/PLS OXIMETRY MLT: CPT

## 2023-08-08 PROCEDURE — 97535 SELF CARE MNGMENT TRAINING: CPT

## 2023-08-08 PROCEDURE — D9220A PRA ANESTHESIA: ICD-10-PCS | Mod: ,,, | Performed by: ANESTHESIOLOGY

## 2023-08-08 PROCEDURE — 61781 SCAN PROC CRANIAL INTRA: CPT | Mod: ,,, | Performed by: NEUROLOGICAL SURGERY

## 2023-08-08 PROCEDURE — C1729 CATH, DRAINAGE: HCPCS | Performed by: NEUROLOGICAL SURGERY

## 2023-08-08 PROCEDURE — 63600175 PHARM REV CODE 636 W HCPCS: Performed by: STUDENT IN AN ORGANIZED HEALTH CARE EDUCATION/TRAINING PROGRAM

## 2023-08-08 PROCEDURE — 25000003 PHARM REV CODE 250: Performed by: STUDENT IN AN ORGANIZED HEALTH CARE EDUCATION/TRAINING PROGRAM

## 2023-08-08 PROCEDURE — D9220A PRA ANESTHESIA: Mod: ,,, | Performed by: ANESTHESIOLOGY

## 2023-08-08 PROCEDURE — 62223 ESTABLISH BRAIN CAVITY SHUNT: CPT | Mod: 62,,, | Performed by: NEUROLOGICAL SURGERY

## 2023-08-08 PROCEDURE — 63600175 PHARM REV CODE 636 W HCPCS: Performed by: NEUROLOGICAL SURGERY

## 2023-08-08 PROCEDURE — 36000711: Performed by: NEUROLOGICAL SURGERY

## 2023-08-08 PROCEDURE — 37000009 HC ANESTHESIA EA ADD 15 MINS: Performed by: NEUROLOGICAL SURGERY

## 2023-08-08 PROCEDURE — 36415 COLL VENOUS BLD VENIPUNCTURE: CPT | Performed by: STUDENT IN AN ORGANIZED HEALTH CARE EDUCATION/TRAINING PROGRAM

## 2023-08-08 PROCEDURE — 27201423 OPTIME MED/SURG SUP & DEVICES STERILE SUPPLY: Performed by: NEUROLOGICAL SURGERY

## 2023-08-08 PROCEDURE — 62223 PR CREATE SHUNT:VENTRIC-PERITONEAL: ICD-10-PCS | Mod: 62,,, | Performed by: SURGERY

## 2023-08-08 PROCEDURE — 71000016 HC POSTOP RECOV ADDL HR: Performed by: NEUROLOGICAL SURGERY

## 2023-08-08 PROCEDURE — 80048 BASIC METABOLIC PNL TOTAL CA: CPT | Performed by: STUDENT IN AN ORGANIZED HEALTH CARE EDUCATION/TRAINING PROGRAM

## 2023-08-08 PROCEDURE — 85025 COMPLETE CBC W/AUTO DIFF WBC: CPT | Performed by: STUDENT IN AN ORGANIZED HEALTH CARE EDUCATION/TRAINING PROGRAM

## 2023-08-08 PROCEDURE — 82962 GLUCOSE BLOOD TEST: CPT | Performed by: NEUROLOGICAL SURGERY

## 2023-08-08 PROCEDURE — 37000008 HC ANESTHESIA 1ST 15 MINUTES: Performed by: NEUROLOGICAL SURGERY

## 2023-08-08 PROCEDURE — 36000710: Performed by: NEUROLOGICAL SURGERY

## 2023-08-08 PROCEDURE — 97166 OT EVAL MOD COMPLEX 45 MIN: CPT

## 2023-08-08 PROCEDURE — 62223 ESTABLISH BRAIN CAVITY SHUNT: CPT | Mod: 62,,, | Performed by: SURGERY

## 2023-08-08 PROCEDURE — 71000033 HC RECOVERY, INTIAL HOUR: Performed by: NEUROLOGICAL SURGERY

## 2023-08-08 PROCEDURE — 71000015 HC POSTOP RECOV 1ST HR: Performed by: NEUROLOGICAL SURGERY

## 2023-08-08 PROCEDURE — 62223 PR CREATE SHUNT:VENTRIC-PERITONEAL: ICD-10-PCS | Mod: 62,,, | Performed by: NEUROLOGICAL SURGERY

## 2023-08-08 DEVICE — KIT CATH WITH BACTISEAL: Type: IMPLANTABLE DEVICE | Site: CRANIAL | Status: FUNCTIONAL

## 2023-08-08 DEVICE — VALVE CHPU PROGRAMMABLE: Type: IMPLANTABLE DEVICE | Site: CRANIAL | Status: FUNCTIONAL

## 2023-08-08 RX ORDER — BUPIVACAINE HYDROCHLORIDE 2.5 MG/ML
INJECTION, SOLUTION EPIDURAL; INFILTRATION; INTRACAUDAL
Status: DISCONTINUED | OUTPATIENT
Start: 2023-08-08 | End: 2023-08-08 | Stop reason: HOSPADM

## 2023-08-08 RX ORDER — MIDAZOLAM HYDROCHLORIDE 1 MG/ML
INJECTION, SOLUTION INTRAMUSCULAR; INTRAVENOUS
Status: DISCONTINUED | OUTPATIENT
Start: 2023-08-08 | End: 2023-08-08

## 2023-08-08 RX ORDER — PROPOFOL 10 MG/ML
VIAL (ML) INTRAVENOUS
Status: DISCONTINUED | OUTPATIENT
Start: 2023-08-08 | End: 2023-08-08

## 2023-08-08 RX ORDER — BACITRACIN ZINC 500 UNIT/G
OINTMENT (GRAM) TOPICAL
Status: DISCONTINUED | OUTPATIENT
Start: 2023-08-08 | End: 2023-08-08 | Stop reason: HOSPADM

## 2023-08-08 RX ORDER — ROCURONIUM BROMIDE 10 MG/ML
INJECTION, SOLUTION INTRAVENOUS
Status: DISCONTINUED | OUTPATIENT
Start: 2023-08-08 | End: 2023-08-08

## 2023-08-08 RX ORDER — VANCOMYCIN HYDROCHLORIDE 500 MG/10ML
INJECTION, POWDER, LYOPHILIZED, FOR SOLUTION INTRAVENOUS
Status: DISCONTINUED | OUTPATIENT
Start: 2023-08-08 | End: 2023-08-08 | Stop reason: HOSPADM

## 2023-08-08 RX ORDER — ONDANSETRON 2 MG/ML
INJECTION INTRAMUSCULAR; INTRAVENOUS
Status: DISCONTINUED | OUTPATIENT
Start: 2023-08-08 | End: 2023-08-08

## 2023-08-08 RX ORDER — ACETAMINOPHEN 10 MG/ML
INJECTION, SOLUTION INTRAVENOUS
Status: DISCONTINUED | OUTPATIENT
Start: 2023-08-08 | End: 2023-08-08

## 2023-08-08 RX ORDER — DEXMEDETOMIDINE HYDROCHLORIDE 100 UG/ML
INJECTION, SOLUTION INTRAVENOUS
Status: DISCONTINUED | OUTPATIENT
Start: 2023-08-08 | End: 2023-08-08

## 2023-08-08 RX ORDER — LIDOCAINE HYDROCHLORIDE 20 MG/ML
INJECTION, SOLUTION EPIDURAL; INFILTRATION; INTRACAUDAL; PERINEURAL
Status: DISCONTINUED | OUTPATIENT
Start: 2023-08-08 | End: 2023-08-08

## 2023-08-08 RX ORDER — NEOSTIGMINE METHYLSULFATE 0.5 MG/ML
INJECTION, SOLUTION INTRAVENOUS
Status: DISCONTINUED | OUTPATIENT
Start: 2023-08-08 | End: 2023-08-08

## 2023-08-08 RX ORDER — CEFAZOLIN SODIUM 1 G/3ML
INJECTION, POWDER, FOR SOLUTION INTRAMUSCULAR; INTRAVENOUS
Status: DISCONTINUED | OUTPATIENT
Start: 2023-08-08 | End: 2023-08-08

## 2023-08-08 RX ORDER — FENTANYL CITRATE 50 UG/ML
INJECTION, SOLUTION INTRAMUSCULAR; INTRAVENOUS
Status: DISCONTINUED | OUTPATIENT
Start: 2023-08-08 | End: 2023-08-08

## 2023-08-08 RX ORDER — OXYCODONE HYDROCHLORIDE 5 MG/1
5 TABLET ORAL EVERY 6 HOURS PRN
Status: DISCONTINUED | OUTPATIENT
Start: 2023-08-08 | End: 2023-08-09 | Stop reason: HOSPADM

## 2023-08-08 RX ORDER — ONDANSETRON 2 MG/ML
4 INJECTION INTRAMUSCULAR; INTRAVENOUS DAILY PRN
Status: DISCONTINUED | OUTPATIENT
Start: 2023-08-08 | End: 2023-08-08 | Stop reason: HOSPADM

## 2023-08-08 RX ORDER — DEXAMETHASONE SODIUM PHOSPHATE 4 MG/ML
INJECTION, SOLUTION INTRA-ARTICULAR; INTRALESIONAL; INTRAMUSCULAR; INTRAVENOUS; SOFT TISSUE
Status: DISCONTINUED | OUTPATIENT
Start: 2023-08-08 | End: 2023-08-08

## 2023-08-08 RX ORDER — HYDROMORPHONE HYDROCHLORIDE 1 MG/ML
0.2 INJECTION, SOLUTION INTRAMUSCULAR; INTRAVENOUS; SUBCUTANEOUS EVERY 5 MIN PRN
Status: DISCONTINUED | OUTPATIENT
Start: 2023-08-08 | End: 2023-08-08 | Stop reason: HOSPADM

## 2023-08-08 RX ADMIN — ACETAMINOPHEN 1000 MG: 10 INJECTION, SOLUTION INTRAVENOUS at 10:08

## 2023-08-08 RX ADMIN — DEXMEDETOMIDINE 4 MCG: 100 INJECTION, SOLUTION, CONCENTRATE INTRAVENOUS at 10:08

## 2023-08-08 RX ADMIN — HYDROMORPHONE HYDROCHLORIDE 0.2 MG: 1 INJECTION, SOLUTION INTRAMUSCULAR; INTRAVENOUS; SUBCUTANEOUS at 12:08

## 2023-08-08 RX ADMIN — ONDANSETRON 4 MG: 2 INJECTION INTRAMUSCULAR; INTRAVENOUS at 11:08

## 2023-08-08 RX ADMIN — OXYCODONE HYDROCHLORIDE 5 MG: 5 TABLET ORAL at 11:08

## 2023-08-08 RX ADMIN — ROCURONIUM BROMIDE 80 MG: 10 INJECTION, SOLUTION INTRAVENOUS at 09:08

## 2023-08-08 RX ADMIN — SODIUM CHLORIDE: 0.9 INJECTION, SOLUTION INTRAVENOUS at 09:08

## 2023-08-08 RX ADMIN — CEFAZOLIN 3 G: 330 INJECTION, POWDER, FOR SOLUTION INTRAMUSCULAR; INTRAVENOUS at 09:08

## 2023-08-08 RX ADMIN — SODIUM CHLORIDE: 9 INJECTION, SOLUTION INTRAVENOUS at 12:08

## 2023-08-08 RX ADMIN — DEXMEDETOMIDINE 8 MCG: 100 INJECTION, SOLUTION, CONCENTRATE INTRAVENOUS at 10:08

## 2023-08-08 RX ADMIN — LIDOCAINE HYDROCHLORIDE 60 MG: 20 INJECTION, SOLUTION EPIDURAL; INFILTRATION; INTRACAUDAL; PERINEURAL at 09:08

## 2023-08-08 RX ADMIN — OXYCODONE HYDROCHLORIDE 5 MG: 5 TABLET ORAL at 12:08

## 2023-08-08 RX ADMIN — FENTANYL CITRATE 100 MCG: 50 INJECTION INTRAMUSCULAR; INTRAVENOUS at 09:08

## 2023-08-08 RX ADMIN — OXYCODONE HYDROCHLORIDE 5 MG: 5 TABLET ORAL at 06:08

## 2023-08-08 RX ADMIN — NEOSTIGMINE METHYLSULFATE 5 MG: 0.5 INJECTION INTRAVENOUS at 11:08

## 2023-08-08 RX ADMIN — MIDAZOLAM HYDROCHLORIDE 2 MG: 2 INJECTION, SOLUTION INTRAMUSCULAR; INTRAVENOUS at 09:08

## 2023-08-08 RX ADMIN — PROPOFOL 200 MG: 10 INJECTION, EMULSION INTRAVENOUS at 09:08

## 2023-08-08 RX ADMIN — DEXAMETHASONE SODIUM PHOSPHATE 4 MG: 4 INJECTION, SOLUTION INTRAMUSCULAR; INTRAVENOUS at 09:08

## 2023-08-08 RX ADMIN — PROPOFOL 50 MG: 10 INJECTION, EMULSION INTRAVENOUS at 10:08

## 2023-08-08 RX ADMIN — GLYCOPYRROLATE 0.6 MG: 0.2 INJECTION, SOLUTION INTRAMUSCULAR; INTRAVENOUS at 11:08

## 2023-08-08 RX ADMIN — PROPOFOL 50 MG: 10 INJECTION, EMULSION INTRAVENOUS at 11:08

## 2023-08-08 NOTE — PLAN OF CARE
Chart reviewed. Preop nursing care completed per orders. Safe surgery checklist complete aside from surgery consent. Spoke with neurosurgery resident on call regarding surgery consent. Consent needs operative side specified. Physician stated he would come by to see patient and update consent with pt. Family at bedside. Bilateral SCDs placed. Call bell within reach. Instructed pt to call for assistance.

## 2023-08-08 NOTE — ANESTHESIA PROCEDURE NOTES
Intubation    Date/Time: 8/8/2023 9:49 AM    Performed by: Chinedu Miller MD  Authorized by: Sundar Fulton MD    Intubation:     Induction:  Intravenous    Intubated:  Postinduction    Mask Ventilation:  Easy with oral airway    Attempts:  1    Attempted By:  Resident anesthesiologist    Method of Intubation:  Video laryngoscopy    Blade:  Guerrero 3    Laryngeal View Grade: Grade I - full view of cords      Difficult Airway Encountered?: No      Complications:  None    Airway Device:  Oral endotracheal tube    Airway Device Size:  7.0    Style/Cuff Inflation:  Cuffed    Inflation Amount (mL):  5    Tube secured:  21    Secured at:  The lips    Placement Verified By:  Capnometry    Complicating Factors:  None    Findings Post-Intubation:  BS equal bilateral and atraumatic/condition of teeth unchanged

## 2023-08-08 NOTE — PT/OT/SLP EVAL
Occupational Therapy   Evaluation & Treatment and Discharge Summary    Name: Earl Mcelroy  MRN: [id number]  Admitting Diagnosis: <principal problem not specified>  Recent Surgery: Procedure(s) (LRB):  INSERTION, SHUNT, VENTRICULOPERITONEAL (Right)  INSERTION, SHUNT, VENTRICULOPERITONEAL, ENDOSCOPIC (N/A) Day of Surgery    Recommendations:     Discharge Recommendations: other (see comments)  Discharge Equipment Recommendations:  none  Barriers to discharge:  None    Assessment:     Earl Mcelroy is a 22 y.o. female with a medical diagnosis of <principal problem not specified>.  She presents with s/p  shunt placement today. Patient is at [facility] and demonstrated completion of ADLs during evaluation. At this time, patient does not need acute OT services. Please re-consult OT if any functional changes. Patient is discharged home with no OT services warranted due to completion of all ADLs independently.    Rehab Prognosis: Good; patient would benefit from acute skilled OT services to address these deficits and reach maximum level of function.       Plan:     Patient is discharged from acute OT services.     Subjective     Chief Complaint: pain in neck  Patient/Family Comments/goals: get better and go home    Occupational Profile:  Living Environment: Patient lives with spouse and his mother in a ground floor apartment with no steps to enter. Patient uses a bathtub shower combo  Previous level of function: independent with ADLs and IADLs; patient is an active ; in cos[employer] school and working  Roles and Routines: wife,   Equipment Used at Home: none  Assistance upon Discharge: , no 24/7 care needed    Pain/Comfort:  Pain Rating 1: 10/10  Location - Orientation 1: posterior  Location 1: neck  Pain Addressed 1: Reposition, Distraction, Nurse notified  Pain Rating Post-Intervention 1: 10/10    Patients cultural, spiritual, Gnosticist conflicts given the current situation:      Objective:      Communicated with: nursing prior to session.  Patient found HOB elevated with peripheral IV, PureWick upon OT entry to room. Patient's multiple family members present in room throughout duration of session.       General Precautions: Standard, fall  Orthopedic Precautions: N/A  Braces: N/A  Respiratory Status: Room air    Occupational Performance:    Bed Mobility:    Patient completed Scooting/Bridging with modified independence  Patient completed Supine to Sit with modified independence  Patient completed Sit to Supine with modified independence    Functional Mobility/Transfers:  Patient completed Sit <> Stand Transfer with modified independence  with  no assistive device   Patient completed Toilet Transfer Step Transfer technique with modified independence with  no AD  Functional Mobility: patient ambulated within home distance with modified independence and no AD(~200 feet) into bathroom to complete ADLs and within hallway to evaluate activity tolerance; noted decreased speed and lack of arm swing due to neck pain    Activities of Daily Living:  Grooming: modified independence to complete hand hygiene while standing at sink  Lower Body Dressing: modified independence to don socks while sitting EOB  Toileting: modified independence to complete clothing management and georges hygiene    Cognitive/Visual Perceptual:  Cognitive/Psychosocial Skills:     -       Oriented to: Person, Place, Time, and Situation   -       Follows Commands/attention:Follows multistep  commands  -       Communication: clear/fluent  -       Memory: No Deficits noted  -       Safety awareness/insight to disability: intact   -       Mood/Affect/Coping skills/emotional control: Appropriate to situation    Physical Exam:  Sensation:    -       Intact  Dominant hand:    -       R  Upper Extremity Range of Motion:     -       Right Upper Extremity: WFL  -       Left Upper Extremity: WFL  Upper Extremity Strength:    -       Right Upper Extremity:  WFL  -       Left Upper Extremity: WFL   Strength:    -       Right Upper Extremity: WFL  -       Left Upper Extremity: WFL  Fine Motor Coordination:    -       Intact    AMPAC 6 Click ADL:  AMPAC Total Score:      Treatment & Education:    Patient educated on:   -purpose of OT and OT POC and lack of need for services at this time  -facilitation and education on proper body mechanics, energy conservation, and safety  -importance of early mobility and out of bed activities     All questions answered within OT scope and to patient's satisfaction    Patient left HOB elevated with all lines intact, call button in reach, nursing notified, and family present    GOALS:   Multidisciplinary Problems       Occupational Therapy Goals       Not on file                    History:     History reviewed. No pertinent past medical history.    History reviewed. No pertinent surgical history.    Time Tracking:     OT Date of Treatment: 08/08/23  OT Start Time: 1536  OT Stop Time: 1605  OT Total Time (min): 29 min    Billable Minutes:Evaluation 10  Self Care/Home Management 19 8/8/2023

## 2023-08-08 NOTE — BRIEF OP NOTE
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  No intrabdominal adhesions noted.    Estimated Blood Loss: Minimal           Specimens (From admission, onward)      None          Implants:   Implant Name Type Inv. Item Serial No.  Lot No. LRB No. Used Action   KIT CATH WITH BACTISEAL - [id number]  KIT CATH WITH BACTISEAL  [employer] [id number] Right 1 Implanted   CODMAN HAKIM PROGRAMMABLE VALVE     [id number] Right 1 Implanted              Disposition: PACU - hemodynamically stable.           Condition: Good    Attestation:  I was present and scrubbed for the entire procedure.

## 2023-08-08 NOTE — TRANSFER OF CARE
"Anesthesia Transfer of Care Note    Patient: Earl Mcelroy    Procedure(s) Performed: Procedure(s) (LRB):  INSERTION, SHUNT, VENTRICULOPERITONEAL (Right)  INSERTION, SHUNT, VENTRICULOPERITONEAL, ENDOSCOPIC (N/A)    Patient location: PACU    Anesthesia Type: general    Transport from OR: Transported from OR on 6-10 L/min O2 by face mask with adequate spontaneous ventilation    Post pain: adequate analgesia    Post assessment: no apparent anesthetic complications    Post vital signs: stable    Level of consciousness: sedated    Nausea/Vomiting: no nausea/vomiting    Complications: none    Transfer of care protocol was followed      Last vitals:   Visit Vitals  BP (!) 117/58 (BP Location: Left arm, Patient Position: Lying)   Pulse 60   Temp 36.9 °C (98.4 °F) (Temporal)   Resp 18   Ht 5' 4" (1.626 m)   Wt 107.7 kg (237 lb 7 oz)   LMP  (LMP Unknown)   SpO2 100%   Breastfeeding No   BMI 40.76 kg/m²     "

## 2023-08-08 NOTE — BRIEF OP NOTE
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 Veronica tim at 100    Estimated Blood Loss: * No values recorded between 8/8/2023 10:41 AM and 8/8/2023 11:32 AM *    Estimated Blood Loss has been documented.         Specimens:   Specimen (24h ago, onward)      None            [id number]

## 2023-08-08 NOTE — OP NOTE
DATE OF PROCEDURE:  8/8/2023     SURGEON:  Khoa Johnson M.D., Ph.D.     ASSISTANT: Ofelia Oneill M.D. (the assistant is a [facility] neurosurgery resident)    CO-SURGEON:  Daren Mckeon M.D.     PREOPERATIVE DIAGNOSES:  Obstructive hydrocephalus  Aqueductal stenosis.      POSTOPERATIVE DIAGNOSES:  Obstructive hydrocephalus  Aqueductal stenosis.      PROCEDURES:  1.  Placement of ventriculoperitoneal shunt.  2.  Stealth navigation.  3.  Laparoscopic placement of peritoneal catheter.     INDICATIONS IN DETAIL:   Ms. Earl Mcelroy is a very pleasant 21 yo female with a PMH of DM, migranes who presented to the ED after headache for the past 2 weeks.  She is having a  shunt placed today.  She reports that her headache is sharp and can affect her forehead and spread to her left face. She has taken Tylenol and ibuprofen with no relief. She came to the ED today after she had an episode of vomiting. Her headaches occur more at night, but may be sporadic.  She had a history of prematurity per her mother. She denies vision changes, seizure, weakness, numbness, tingling or other neurologic symptoms. Her migraines as a child were of much different intensity and severity.  CT head and MRI brain obtained with mild aqueductal stenosis with culpocephaly; no intracranial hemorrhage.  MRI without enhancing mass lesion, no transependymal flow or sinus occlusion.  Given that this patient has hydrocephalus in his now symptomatic, we have offered her a ventriculoperitoneal shunt.  I have explained the risks, benefits, and alternatives.  She wishes to proceed.    PROCEDURE IN DETAIL:    The patient was seen in the pretreatment area and the risks, benefits and alternatives were described.  The patient and the patient's family   wished to proceed.  The patient was brought to the operating room and a general anesthetic was administered.  All proper lines were placed.    The patient was placed in the supine position with a bump  underneath the right shoulder and the arms were tucked.  The patient's head was turned to the left to allow access to the right parietal region.  The Stealth navigation system was brought to the field and accurate registration was achieved using the tracer function and an MRI that the patient had previously.  The starting point in the right parietal region had been chosen.  This was marked on the patient's head.  The target was the area above the Foramen of Carroll.  The patient's hair was clipped in the area of the starting region and the parietal and posterior auricular region.  The patient's head, neck, chest and abdomen were cleaned, prepped and draped in the usual fashion.  A lidocaine-bupivacaine mix was injected around a curvilinear line made near the starting site.  An incision was made along that aforementioned line and carried down to the galea using Bovie cautery.  The flap was dissected and a pocket was made in the posterior auricular region.  A Codman Hakim valve was obtained and set at 100.  This was attached to a peritoneal catheter.  Using a shunt passer, a path between the posterior auricular region and the abdomen was made.  Using a #2 tie, we attached this to the peritoneal catheter and pulled the peritoneal catheter and the shunt valve in the subcutaneous region down to the abdomen.  Once this was performed, the pericranium was removed from the bone using Bovie cautery.  Using a high-speed Hakan drill with an acorn eagle, a eagle hole was made.  The dura was cut and coagulated as well as the tam.  The ventricular catheter was placed over a shunt probe and this was passed through the substance of the brain into the ventricle and towards the midline using the Stealth navigation system.  Once this was performed, the catheter was cut to the appropriate length and attached to the valve.      At this point, Dr. Mckeon entered the room for placement of the peritoneal catheter.  Please see his notes  for the details of this.  Once this   was complete, the wounds were irrigated copiously.  All bleeding points were coagulated.  The incisions were closed in layers with a 4-0 Monocryl in the skin.  Clean dressings were placed and the patient was then awakened by Anesthesia staff.  At this point, the patient was awake and following commands and she was extubated.     Estimated blood loss was less than 5 mL.     There were no intraprocedural complications.     All counts were correct at the end of surgery.     Dr. Khoa Johnson was present during the entire procedure.

## 2023-08-08 NOTE — NURSING TRANSFER
Nursing Transfer Note      8/8/2023   1:40 PM    Nurse giving handoff:Jadyn RAMON  Nurse receiving handoff:NPU  nurse    Reason patient is being transferred: post anesthesia    Transfer From: PACU to XRAY=> CT=>955    Transfer via stretcher    Transfer with N/A    Transported by Bertram, pacu tech    Transfer Vital Signs:  Blood Pressure:122/58  Heart Rate:67  O2:99  Temperature:98  Respirations:18    Order for Tele Monitor? Yes    Medicines sent: NS @100ml/hr    Any special needs or follow-up needed: routine    Patient belongings transferred with patient:  cellphone    Chart send with patient: Yes    Notified: mother

## 2023-08-08 NOTE — OP NOTE
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 The patient was   prepped and draped in the usual manner. The access to the peritoneum was gained  through ruq abdomen using Optiview trocar under direct vision.   Pneumoperitoneum to 15 mmHg with CO2 gas was attained. The shunt was in the   right upper quadrant. It was brought into the abdominal cavity on a mosquito. The suture passer was placed through the right upper quadrant under direct vision and pulled the shunt into the abdominal cavity to its full extent. The abdomen was inspected for hemostasis. Pneumoperitoneum was released. The trocar was removed. The skin incisions were closed with 4-0 plain catgut, and reinforced   with Dermabond. The patient tolerated the procedure  well, was brought to Recovery Room in stable condition. Sponge and needle   counts were correct at the end of the case.    PATHOLOGY:  for my part of the procedure is none.  COMPLICATIONS: None.  BLOOD LOSS: Minimal.

## 2023-08-09 VITALS
BODY MASS INDEX: 40.54 KG/M2 | RESPIRATION RATE: 14 BRPM | OXYGEN SATURATION: 99 % | SYSTOLIC BLOOD PRESSURE: 150 MMHG | HEART RATE: 76 BPM | DIASTOLIC BLOOD PRESSURE: 80 MMHG | WEIGHT: 237.44 LBS | HEIGHT: 64 IN | TEMPERATURE: 97 F

## 2023-08-09 LAB
ANION GAP SERPL CALC-SCNC: 12 MMOL/L (ref 8–16)
BASOPHILS # BLD AUTO: 0.02 K/UL (ref 0–0.2)
BASOPHILS NFR BLD: 0.1 % (ref 0–1.9)
BUN SERPL-MCNC: 8 MG/DL (ref 6–20)
CALCIUM SERPL-MCNC: 9.2 MG/DL (ref 8.7–10.5)
CHLORIDE SERPL-SCNC: 104 MMOL/L (ref 95–110)
CO2 SERPL-SCNC: 20 MMOL/L (ref 23–29)
CREAT SERPL-MCNC: 0.8 MG/DL (ref 0.5–1.4)
DIFFERENTIAL METHOD: ABNORMAL
EOSINOPHIL # BLD AUTO: 0 K/UL (ref 0–0.5)
EOSINOPHIL NFR BLD: 0 % (ref 0–8)
ERYTHROCYTE [DISTWIDTH] IN BLOOD BY AUTOMATED COUNT: 13.7 % (ref 11.5–14.5)
EST. GFR  (NO RACE VARIABLE): >60 ML/MIN/1.73 M^2
GLUCOSE SERPL-MCNC: 100 MG/DL (ref 70–110)
HCT VFR BLD AUTO: 33.8 % (ref 37–48.5)
HGB BLD-MCNC: 10.7 G/DL (ref 12–16)
IMM GRANULOCYTES # BLD AUTO: 0.08 K/UL (ref 0–0.04)
IMM GRANULOCYTES NFR BLD AUTO: 0.5 % (ref 0–0.5)
LYMPHOCYTES # BLD AUTO: 1.9 K/UL (ref 1–4.8)
LYMPHOCYTES NFR BLD: 10.8 % (ref 18–48)
MCH RBC QN AUTO: 27.2 PG (ref 27–31)
MCHC RBC AUTO-ENTMCNC: 31.7 G/DL (ref 32–36)
MCV RBC AUTO: 86 FL (ref 82–98)
MONOCYTES # BLD AUTO: 1.3 K/UL (ref 0.3–1)
MONOCYTES NFR BLD: 7.3 % (ref 4–15)
NEUTROPHILS # BLD AUTO: 14 K/UL (ref 1.8–7.7)
NEUTROPHILS NFR BLD: 81.3 % (ref 38–73)
NRBC BLD-RTO: 0 /100 WBC
PLATELET # BLD AUTO: 279 K/UL (ref 150–450)
PMV BLD AUTO: 10.9 FL (ref 9.2–12.9)
POTASSIUM SERPL-SCNC: 3.8 MMOL/L (ref 3.5–5.1)
RBC # BLD AUTO: 3.93 M/UL (ref 4–5.4)
SODIUM SERPL-SCNC: 136 MMOL/L (ref 136–145)
WBC # BLD AUTO: 17.16 K/UL (ref 3.9–12.7)

## 2023-08-09 PROCEDURE — 85025 COMPLETE CBC W/AUTO DIFF WBC: CPT | Performed by: STUDENT IN AN ORGANIZED HEALTH CARE EDUCATION/TRAINING PROGRAM

## 2023-08-09 PROCEDURE — 25000003 PHARM REV CODE 250: Performed by: STUDENT IN AN ORGANIZED HEALTH CARE EDUCATION/TRAINING PROGRAM

## 2023-08-09 PROCEDURE — 99239 HOSP IP/OBS DSCHRG MGMT >30: CPT | Mod: ,,, | Performed by: PHYSICIAN ASSISTANT

## 2023-08-09 PROCEDURE — 97161 PT EVAL LOW COMPLEX 20 MIN: CPT

## 2023-08-09 PROCEDURE — 80048 BASIC METABOLIC PNL TOTAL CA: CPT | Performed by: STUDENT IN AN ORGANIZED HEALTH CARE EDUCATION/TRAINING PROGRAM

## 2023-08-09 PROCEDURE — 99239 PR HOSPITAL DISCHARGE DAY,>30 MIN: ICD-10-PCS | Mod: ,,, | Performed by: PHYSICIAN ASSISTANT

## 2023-08-09 PROCEDURE — 36415 COLL VENOUS BLD VENIPUNCTURE: CPT | Performed by: STUDENT IN AN ORGANIZED HEALTH CARE EDUCATION/TRAINING PROGRAM

## 2023-08-09 PROCEDURE — 63600175 PHARM REV CODE 636 W HCPCS: Performed by: STUDENT IN AN ORGANIZED HEALTH CARE EDUCATION/TRAINING PROGRAM

## 2023-08-09 PROCEDURE — 97530 THERAPEUTIC ACTIVITIES: CPT

## 2023-08-09 RX ORDER — METHOCARBAMOL 500 MG/1
500 TABLET, FILM COATED ORAL 4 TIMES DAILY
Qty: 40 TABLET | Refills: 0 | Status: SHIPPED | OUTPATIENT
Start: 2023-08-09 | End: 2023-08-21

## 2023-08-09 RX ORDER — HYDROCODONE BITARTRATE AND ACETAMINOPHEN 5; 325 MG/1; MG/1
1 TABLET ORAL EVERY 6 HOURS PRN
Qty: 28 TABLET | Refills: 0 | Status: SHIPPED | OUTPATIENT
Start: 2023-08-09 | End: 2023-10-12

## 2023-08-09 RX ORDER — ONDANSETRON 4 MG/1
8 TABLET, ORALLY DISINTEGRATING ORAL EVERY 8 HOURS PRN
Qty: 8 TABLET | Refills: 0 | Status: SHIPPED | OUTPATIENT
Start: 2023-08-09 | End: 2023-10-12

## 2023-08-09 RX ADMIN — MORPHINE SULFATE 1 MG: 2 INJECTION, SOLUTION INTRAMUSCULAR; INTRAVENOUS at 12:08

## 2023-08-09 RX ADMIN — MORPHINE SULFATE 1 MG: 2 INJECTION, SOLUTION INTRAMUSCULAR; INTRAVENOUS at 07:08

## 2023-08-09 RX ADMIN — ONDANSETRON 4 MG: 2 INJECTION INTRAMUSCULAR; INTRAVENOUS at 09:08

## 2023-08-09 RX ADMIN — OXYCODONE HYDROCHLORIDE 5 MG: 5 TABLET ORAL at 01:08

## 2023-08-09 RX ADMIN — OXYCODONE HYDROCHLORIDE 5 MG: 5 TABLET ORAL at 05:08

## 2023-08-09 NOTE — PT/OT/SLP EVAL
Physical Therapy Evaluation and Discharge Note    Patient Name:  Earl Mcelroy   MRN:  [id number]    Recommendations:     Discharge Recommendations: other (see comments)  Discharge Equipment Recommendations: none   Barriers to discharge: None    Assessment:     Earl Mcelroy is a 22 y.o. female admitted with a medical diagnosis of <principal problem not specified>. .  At this time, patient is functioning at their prior level of function and does not require further acute PT services.     Recent Surgery: Procedure(s) (LRB):  INSERTION, SHUNT, VENTRICULOPERITONEAL (Right)  INSERTION, SHUNT, VENTRICULOPERITONEAL, ENDOSCOPIC (N/A) 1 Day Post-Op    Plan:     During this hospitalization, patient does not require further acute PT services.  Please re-consult if situation changes.      Subjective     Chief Complaint: pain   Patient/Family Comments/goals: pt wants to get back home and better quickly to return to work and school  Pain/Comfort:  Pain Rating 1: 10/10  Location - Side 1: Right  Location - Orientation 1: generalized  Location 1: head  Pain Addressed 1: Reposition, Distraction, Nurse notified  Pain Rating Post-Intervention 1: 10/10    Patients cultural, spiritual, Episcopalian conflicts given the current situation: no    Living Environment:  Pt lives w/ spouse and mom in a 1st floor apartment with no ALONA.  Prior to admission, patients level of function was independent; works at [employer] and goes to [employer] school.  Equipment used at home: none.  DME owned (not currently used): none.  Upon discharge, patient will have assistance from family.    Objective:     Communicated with Rn prior to session.  Patient found HOB elevated with peripheral IV upon PT entry to room.    General Precautions: Standard, fall    Orthopedic Precautions:N/A   Braces: N/A  Respiratory Status: Room air    Exams:  Cognitive Exam:  Patient is oriented to Person, Place, Time, and Situation  Fine Motor Coordination:    -        Intact  Sensation:    -       Intact  RLE ROM: WFL  RLE Strength: WFL  LLE ROM: WFL  LLE Strength: WFL    Functional Mobility:  Bed Mobility:     Supine to Sit: independence  Sit to Supine: independence  Transfers:     Sit to Stand:  independence with no AD  Gait: 350' independently  Tinetti Total Score: 27  < 18 = High Risk of Falls, 19-23 = Moderate Risk of Falls, > 24 = Low Risk of Falls    AM-PAC 6 CLICK MOBILITY  Total Score:24       Treatment and Education:  Pt educated on performing gentle neck and shoulder AROM as tolerated to help w/ decreasing muscle tightness and pain  Upper and lower body dressing in sitting w/ independence  Pt and family educated on PT assessment and plan to d/c orders 2/2 no current needs for skilled PT. Pt instructed to slowly progress mobility each day towards PLOF without attempting to jump right into previous daily routine immediately upon d/c 2/2 general deconditioning from being in hospital. Pt and family in agreement w/ POC and verbalized understanding w/ plan to slowly progress to normal daily activities at home and inform MD team if pt experiences any difficulty progressing back to PLOF while here or upon d/c in order for PT to be re-consulted/orders placed for OP PT as needed.     AM-PAC 6 CLICK MOBILITY  Total Score:24     Patient left HOB elevated with all lines intact, call button in reach, RN notified, and family present.    GOALS:   Multidisciplinary Problems       Physical Therapy Goals       Not on file              Multidisciplinary Problems (Resolved)          Problem: Physical Therapy    Goal Priority Disciplines Outcome Goal Variances Interventions   Physical Therapy Goal   (Resolved)     PT, PT/OT Met     Description: Goals to be completed by: 9/9/23    Pt will perform sup<>sit transfers w/ independence  Pt will have sufficient dynamic balance to sit EOB while performing ADLs/therex w/ independence  Pt will be able to stand up from EOB w/ independence using no  AD  Pt will ambulate 250 feet w/ independence using no AD                       History:     History reviewed. No pertinent past medical history.    Past Surgical History:   Procedure Laterality Date    ENDOSCOPIC INSERTION OF VENTRICULOPERITONEAL SHUNT N/A 8/8/2023    Procedure: INSERTION, SHUNT, VENTRICULOPERITONEAL, ENDOSCOPIC;  Surgeon: Khoa Johnson MD;  Location: [address];  Service: Neurosurgery;  Laterality: N/A;    VENTRICULOPERITONEAL SHUNT Right 8/8/2023    Procedure: INSERTION, SHUNT, VENTRICULOPERITONEAL;  Surgeon: Khoa Johnson MD;  Location: [facility] OR [address] [place] [address];  Service: Neurosurgery;  Laterality: Right;  R side ventriculoperitoneal shunt placement       Time Tracking:     PT Received On: 08/09/23  PT Start Time: 0841     PT Stop Time: 0859  PT Total Time (min): 18 min     Billable Minutes: Evaluation 8 and Therapeutic Activity 10      08/09/2023

## 2023-08-09 NOTE — PLAN OF CARE
Problem: Physical Therapy  Goal: Physical Therapy Goal  Description: Goals to be completed by: 9/9/23    Pt will perform sup<>sit transfers w/ independence  Pt will have sufficient dynamic balance to sit EOB while performing ADLs/therex w/ independence  Pt will be able to stand up from EOB w/ independence using no AD  Pt will ambulate 250 feet w/ independence using no AD  Outcome: Met

## 2023-08-09 NOTE — PLAN OF CARE
[facility] (Hospital)  Discharge Final Note    Primary Care Provider: Jalyn Castillo FNP    Expected Discharge Date: 8/9/2023    Patient to be discharged home.  The patient does not have any home needs.  Family to provide transportation home.  Patient was provided with a return to work/school excuse in 2 weeks.  Neurosurgery clinic to schedule follow up appointment.    Final Discharge Note (most recent)       Final Note - 08/09/23 1510          Final Note    Assessment Type Final Discharge Note     Anticipated Discharge Disposition Home or Self Care        Post-Acute Status    Post-Acute Authorization Other     Other Status No Post-Acute Service Needs     Discharge Delays None known at this time                     Important Message from Medicare

## 2023-08-09 NOTE — DISCHARGE SUMMARY
Neptali Smith - Neurosurgery ([facility])  Neurosurgery  Discharge Summary      Patient Name: Earl Mcelroy  MRN: [id number]  Admission Date: 8/5/2023  Hospital Length of Stay: 2 days  Discharge Date and Time:  08/09/2023 4:42 PM  Attending Physician: Khoa Johnson MD   Discharging Provider: Luci Tripathi PA-C  Primary Care Provider: Jalyn Castillo FNP    HPI:   Earl Mcelroy is a very pleasant 21 yo female with a PMH of DM, migranes who presents to the ED after headache for the past 2 weeks.  Her headache will be sharp and affect her forehead and spread to her left face. Tylenol and ibuprofen do not relieve symptoms. She came to the ED today after she had an episode of vomiting. She denies vision changes, seizure, weakness, numbness, tingling or other neurologic symptoms. Her migraines as a child were of much different intensity and severity.       Procedure(s) (LRB):  INSERTION, SHUNT, VENTRICULOPERITONEAL (Right)  INSERTION, SHUNT, VENTRICULOPERITONEAL, ENDOSCOPIC (N/A)     Hospital Course: 8/6 naeon, exam stable, nausea and pain controlled. MRI completed without significant findings or changes from CTH. Ophtho consult today, Plan for OR Tuesday for VPS  8/7: No papilledema on ophtho exam. Plan for VPS placement tomorrow. NPO @ midnight. Cleared for surgery by HM.   8/8: NAEON. AFVSS. Neurologically stable. +flatus. Tolerating PO diet and voiding spontaneously. Medically stable to discharge home. Follow up in clinic in 2 weeks. Discharge instructions given verbally to patient. All of her questions were answered. She encouraged to call the clinic with any questions or concerns prior to follow up appt.     Physical exam 8/9:  General: well developed, well nourished, no distress.   Head: normocephalic, cranial incision C/D/I  Neurologic: Alert and oriented. Thought content appropriate.  GCS: Motor: 6/Verbal: 5/Eyes: 4 GCS Total: 15  Mental Status: Awake, Alert, Oriented x 4  Language: No aphasia  Speech: No  dysarthria  Cranial nerves: face symmetric, tongue midline, CN II-XII grossly intact.   Eyes: pupils equal, round, reactive to light with accommodation, EOMI.   Pulmonary: normal respirations, no signs of respiratory distress  Abdomen: soft, non-distended, not tender to palpation  Skin: Skin is warm, dry and intact.  Sensory: intact to light touch throughout  Motor Strength:Moves all extremities spontaneously with good tone.  Full strength upper and lower extremities. No abnormal movements seen.                   Goals of Care Treatment Preferences:  Code Status: Full Code      Consults:   Consults (From admission, onward)        Status Ordering Provider     Inpatient consult to Ophthalmology  Once        Provider:  (Not yet assigned)    Completed MONSTER MCKINLEY     Inpatient consult to Neurosurgery  Once        Provider:  (Not yet assigned)    Completed NHI WEIR          Significant Diagnostic Studies: Labs:   BMP:   Recent Labs   Lab 08/08/23  0236 08/09/23  0624   GLU 87 100    136   K 4.3 3.8    104   CO2 20* 20*   BUN 10 8   CREATININE 0.8 0.8   CALCIUM 9.0 9.2   , CBC   Recent Labs   Lab 08/08/23  0236 08/09/23  0624   WBC 9.94 17.16*   HGB 10.3* 10.7*   HCT 32.3* 33.8*    279    and All labs within the past 24 hours have been reviewed  Radiology: X-Ray: shunt series  MRI: brain w/wo contrast  CT scan: head wo contrast    Pending Diagnostic Studies:     None        Final Active Diagnoses:    Diagnosis Date Noted POA    PRINCIPAL PROBLEM:  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 Date: 08/09/24     Order Specific Question Answer Comments   May the Radiologist modify the order per protocol to meet the clinical needs of the patient? Yes      Notify your health care provider if you experience any of the following:  increased confusion or weakness     Notify your health care provider if you experience any of the following:  persistent dizziness, light-headedness, or visual disturbances     Notify your health care provider if you experience any of the following:  worsening rash     Notify your health care provider if you experience any of the following:  severe persistent headache     Notify your health care provider if you experience any of the following:  difficulty breathing or increased cough     Notify your health care provider if you experience any of the following:  redness, tenderness, or signs of infection (pain, swelling, redness, odor or green/yellow discharge around incision site)     Notify your health care provider if you experience any of the following:  severe uncontrolled pain     Notify your health care provider if you experience any of the following:  persistent nausea and vomiting or diarrhea     Notify your health care provider if you experience any of the following:  temperature >100.4     Activity as tolerated     Medications:  Reconciled Home Medications:      Medication List      START taking these medications    HYDROcodone-acetaminophen 5-325 mg per tablet  Commonly known as: NORCO  Take 1 tablet by mouth every 6 (six) hours as needed for Pain.     methocarbamoL 500 MG Tab  Commonly known as: ROBAXIN  Take 1 tablet (500 mg total) by mouth 4 (four) times daily. for 10 days        CHANGE how you take these medications    * ondansetron 4 MG Tbdl  Commonly known as: ZOFRAN-ODT  Take 1 tablet (4 mg total) by mouth every 8 (eight) hours as needed (vomiting).  What changed: Another medication with the same name was added. Make sure you understand  how and when to take each.     * ondansetron 4 MG Tbdl  Commonly known as: ZOFRAN-ODT  Dissolve 2 tablets (8 mg total) by mouth every 8 (eight) hours as needed (nausea/vomiting).  What changed: You were already taking a medication with the same name, and this prescription was added. Make sure you understand how and when to take each.         * This list has 2 medication(s) that are the same as other medications prescribed for you. Read the directions carefully, and ask your doctor or other care provider to review them with you.            CONTINUE taking these medications    metFORMIN 500 MG tablet  Commonly known as: GLUCOPHAGE  Take 500 mg by mouth once.        STOP taking these medications    diclofenac 50 MG EC tablet  Commonly known as: GEOVANY Tripathi PA-C  Neurosurgery  Neptali vandana - Neurosurgery ([facility])

## 2023-08-11 ENCOUNTER — PATIENT OUTREACH (OUTPATIENT)
Dept: ADMINISTRATIVE | Facility: CLINIC | Age: 23
End: 2023-08-11
Payer: MEDICAID

## 2023-08-11 NOTE — PROGRESS NOTES
C3 nurse spoke with Earl Mcelroy  for a TCC post hospital discharge follow up call. The patient has a scheduled HOSFU appointment with [facility]  Neuro on 08/22/2023 @ 745 am.

## 2023-08-14 NOTE — ANESTHESIA POSTPROCEDURE EVALUATION
Anesthesia Post Evaluation    Patient: Earl Mcelroy    Procedure(s) Performed: Procedure(s) (LRB):  INSERTION, SHUNT, VENTRICULOPERITONEAL (Right)  INSERTION, SHUNT, VENTRICULOPERITONEAL, ENDOSCOPIC (N/A)    Final Anesthesia Type: general      Patient location during evaluation: PACU  Patient participation: Yes- Able to Participate  Level of consciousness: awake and alert and oriented  Post-procedure vital signs: reviewed and stable  Pain management: adequate  Airway patency: patent    PONV status at discharge: No PONV  Anesthetic complications: no      Cardiovascular status: hemodynamically stable  Respiratory status: unassisted, spontaneous ventilation and room air  Hydration status: euvolemic  Follow-up not needed.          Vitals Value Taken Time   /80 08/09/23 1110   Temp 36.2 °C (97.2 °F) 08/09/23 1110   Pulse 76 08/09/23 1110   Resp 14 08/09/23 1352   SpO2 99 % 08/09/23 1110         Event Time   Out of Recovery 08/08/2023 12:15:00         Pain/Rivka Score: No data recorded

## 2023-08-22 ENCOUNTER — OFFICE VISIT (OUTPATIENT)
Dept: NEUROSURGERY | Facility: CLINIC | Age: 23
End: 2023-08-22
Payer: MEDICAID

## 2023-08-22 ENCOUNTER — HOSPITAL ENCOUNTER (OUTPATIENT)
Dept: RADIOLOGY | Facility: HOSPITAL | Age: 23
Discharge: HOME | End: 2023-08-22
Attending: PHYSICIAN ASSISTANT
Payer: MEDICAID

## 2023-08-22 VITALS — HEART RATE: 68 BPM | SYSTOLIC BLOOD PRESSURE: 122 MMHG | TEMPERATURE: 99 F | DIASTOLIC BLOOD PRESSURE: 80 MMHG

## 2023-08-22 DIAGNOSIS — G91.9 HYDROCEPHALUS, UNSPECIFIED TYPE: ICD-10-CM

## 2023-08-22 DIAGNOSIS — Q03.0 AQUEDUCTAL STENOSIS: Primary | ICD-10-CM

## 2023-08-22 PROCEDURE — 1159F MED LIST DOCD IN RCRD: CPT | Mod: CPTII,,, | Performed by: PHYSICIAN ASSISTANT

## 2023-08-22 PROCEDURE — 3074F SYST BP LT 130 MM HG: CPT | Mod: CPTII,,, | Performed by: PHYSICIAN ASSISTANT

## 2023-08-22 PROCEDURE — 1159F PR MEDICATION LIST DOCUMENTED IN MEDICAL RECORD: ICD-10-PCS | Mod: CPTII,,, | Performed by: PHYSICIAN ASSISTANT

## 2023-08-22 PROCEDURE — 3079F PR MOST RECENT DIASTOLIC BLOOD PRESSURE 80-89 MM HG: ICD-10-PCS | Mod: CPTII,,, | Performed by: PHYSICIAN ASSISTANT

## 2023-08-22 PROCEDURE — 99024 PR POST-OP FOLLOW-UP VISIT: ICD-10-PCS | Mod: ,,, | Performed by: PHYSICIAN ASSISTANT

## 2023-08-22 PROCEDURE — 70450 CT HEAD WITHOUT CONTRAST: ICD-10-PCS | Mod: 26,,, | Performed by: RADIOLOGY

## 2023-08-22 PROCEDURE — 99999 PR PBB SHADOW E&M-EST. PATIENT-LVL III: CPT | Mod: PBBFAC,,, | Performed by: PHYSICIAN ASSISTANT

## 2023-08-22 PROCEDURE — 3079F DIAST BP 80-89 MM HG: CPT | Mod: CPTII,,, | Performed by: PHYSICIAN ASSISTANT

## 2023-08-22 PROCEDURE — 99024 POSTOP FOLLOW-UP VISIT: CPT | Mod: ,,, | Performed by: PHYSICIAN ASSISTANT

## 2023-08-22 PROCEDURE — 70450 CT HEAD/BRAIN W/O DYE: CPT | Mod: TC

## 2023-08-22 PROCEDURE — 3074F PR MOST RECENT SYSTOLIC BLOOD PRESSURE < 130 MM HG: ICD-10-PCS | Mod: CPTII,,, | Performed by: PHYSICIAN ASSISTANT

## 2023-08-22 PROCEDURE — 99213 OFFICE O/P EST LOW 20 MIN: CPT | Mod: PBBFAC,25 | Performed by: PHYSICIAN ASSISTANT

## 2023-08-22 PROCEDURE — 99999 PR PBB SHADOW E&M-EST. PATIENT-LVL III: ICD-10-PCS | Mod: PBBFAC,,, | Performed by: PHYSICIAN ASSISTANT

## 2023-08-22 PROCEDURE — 70450 CT HEAD/BRAIN W/O DYE: CPT | Mod: 26,,, | Performed by: RADIOLOGY

## 2023-08-22 NOTE — LETTER
August 22, 2023      Neptali Nettles - Neurosurgery 8th Fl  [address]  [facility] [address]  Phone: [phone]  Fax: [phone]       Patient: Earl Mcelroy   YOB: 2000  Date of Visit: 08/22/2023    To Whom It May Concern:    Trung Mcelroy  was at [facility] on 08/22/2023. She may return to work/school on 9/19/2023 with no restrictions. If you have any questions or concerns, or if I can be of further assistance, please do not hesitate to contact me.    Sincerely,    Luci Tripathi PA-C

## 2023-08-22 NOTE — PROGRESS NOTES
Wound Check   Neurosurgery     Earl Mcelroy is a 22 y.o. 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 Johnson (Hakim @ 100). Reports improved headaches from pre-op, but still gets headaches with loud noises and with sneezing/coughing. Reports dizziness/nausea with hot showers. Denies fevers, chills, night sweats or N/V.       Physical Exam:   General: well developed, well nourished, no distress  Neurologic: Alert and oriented. Thought content appropriate.   GCS: Motor: 6/Verbal: 5/Eyes: 4 GCS Total: 15   Mental Status: Awake, Alert, Oriented x3   Cranial nerves: face symmetric, tongue midline, pupils equal, round, reactive to light with accomodation, EOMI.   Motor Strength: moves all extremities with good strength and tone   Sensation: response to light touch throughout  No gait disturbances     Cranial/abdominal incision is clean, dry and intact with no signs of erythema, swelling or purulent drainage. Dissolvable sutures are intact. All skin edges are completely approximated.       Vitals:    08/22/23 0910   BP: 122/80   Pulse: 68   Temp: 98.5 °F (36.9 °C)     Imaging:  I have personally reviewed imaging and agree with the findings    [facility] (8/22/23): interval decrease in ventricular size, 3rd vent now measuring 8 mm compared to immediate post op scan measuring 11.8 mm        Assessment/Plan:   22 y.o. 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 Johnson (Hakim @ 100).    -Keep incision open to air   -No need to continue putting bacitracin ointment on wound   -Can shower and get incision wet, just pat dry and no vigorous scrubbing. Do not submerge incision for another 4 weeks.   -No lifting more than 10 lbs or excessive bending/twisting.   -Can drive after 4 weeks when no longer taking narcotics   -Follow up with me in 4 weeks with  repeat CTH prior, consider raising shunt to 120 if vents decompressed and still having headaches with valsalva.   -Encouraged patient to call if they have any questions or concerns prior to next follow up appt      Luci Tripathi PA-C  Neurosurgery   [facility]

## 2023-09-25 ENCOUNTER — HOSPITAL ENCOUNTER (OUTPATIENT)
Dept: RADIOLOGY | Facility: HOSPITAL | Age: 23
Discharge: HOME | End: 2023-09-25
Attending: PHYSICIAN ASSISTANT
Payer: MEDICAID

## 2023-09-25 DIAGNOSIS — Q03.0 AQUEDUCTAL STENOSIS: ICD-10-CM

## 2023-09-25 PROCEDURE — 70450 CT HEAD/BRAIN W/O DYE: CPT | Mod: 26,,, | Performed by: RADIOLOGY

## 2023-09-25 PROCEDURE — 70450 CT HEAD/BRAIN W/O DYE: CPT | Mod: TC

## 2023-09-25 PROCEDURE — 70450 CT HEAD WITHOUT CONTRAST: ICD-10-PCS | Mod: 26,,, | Performed by: RADIOLOGY

## 2023-10-03 ENCOUNTER — OFFICE VISIT (OUTPATIENT)
Dept: NEUROSURGERY | Facility: CLINIC | Age: 23
End: 2023-10-03
Payer: MEDICAID

## 2023-10-03 DIAGNOSIS — Q03.0 AQUEDUCTAL STENOSIS: Primary | ICD-10-CM

## 2023-10-03 PROCEDURE — 99024 PR POST-OP FOLLOW-UP VISIT: ICD-10-PCS | Mod: 95,,, | Performed by: PHYSICIAN ASSISTANT

## 2023-10-03 PROCEDURE — 99024 POSTOP FOLLOW-UP VISIT: CPT | Mod: 95,,, | Performed by: PHYSICIAN ASSISTANT

## 2023-10-03 NOTE — PROGRESS NOTES
The patient location is: [facility]  The chief complaint leading to consultation is: follow up    Visit type: audiovisual    Face to Face time with patient: 20 minutes of total time spent on the encounter, which includes face to face time and non-face to face time preparing to see the patient (eg, review of tests), Obtaining and/or reviewing separately obtained history, Documenting clinical information in the electronic or other health record, Independently interpreting results (not separately reported) and communicating results to the patient/family/caregiver, or Care coordination (not separately reported).         Each patient to whom he or she provides medical services by telemedicine is:  (1) informed of the relationship between the physician and patient and the respective role of any other health care provider with respect to management of the patient; and (2) notified that he or she may decline to receive medical services by telemedicine and may withdraw from such care at any time.    Notes:       Neurosurgery  Established Patient    SUBJECTIVE:     History of Present Illness:  Earl Mcelroy is a 22 y.o. 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 Johnson (Hakim @ 100). Patient reports continued daily headaches, worse with valsalva. She reports headaches have improved since prior to surgery. She reports heat intolerance as well which has impacted to work her job a [employer].    Review of patient's allergies indicates:  No Known Allergies    Current Outpatient Medications   Medication Sig Dispense Refill    HYDROcodone-acetaminophen (NORCO) 5-325 mg per tablet Take 1 tablet by mouth every 6 (six) hours as needed for Pain. 28 tablet 0    metFORMIN (GLUCOPHAGE) 500 MG tablet Take 500 mg by mouth once.      ondansetron (ZOFRAN-ODT) 4 MG TbDL Take 1 tablet (4 mg total) by mouth every 8 (eight)  hours as needed (vomiting). 12 tablet 0    ondansetron (ZOFRAN-ODT) 4 MG TbDL Dissolve 2 tablets (8 mg total) by mouth every 8 (eight) hours as needed (nausea/vomiting). 8 tablet 0     No current facility-administered medications for this visit.       No past medical history on file.  Past Surgical History:   Procedure Laterality Date    ENDOSCOPIC INSERTION OF VENTRICULOPERITONEAL SHUNT N/A 8/8/2023    Procedure: INSERTION, SHUNT, VENTRICULOPERITONEAL, ENDOSCOPIC;  Surgeon: Khoa Johnson MD;  Location: [facility] OR [address] [place] [address];  Service: Neurosurgery;  Laterality: N/A;    VENTRICULOPERITONEAL SHUNT Right 8/8/2023    Procedure: INSERTION, SHUNT, VENTRICULOPERITONEAL;  Surgeon: Khoa Johnson MD;  Location: [facility] OR [address] [place] [address];  Service: Neurosurgery;  Laterality: Right;  R side ventriculoperitoneal shunt placement     Family History    None       Social History     Socioeconomic History    Marital status: Significant Other     Social Determinants of Health     Stress: Stress Concern Present (8/6/2023)    [employer] - Occupational Stress Questionnaire     Feeling of Stress : To some extent   Social Connections: Unknown (8/6/2023)    Social Connection and Isolation Panel [NHANES]     Frequency of Communication with Friends and Family: More than three times a week     Marital Status: Living with partner       Review of Systems    OBJECTIVE:     Vital Signs     There is no height or weight on file to calculate BMI.    Neurosurgery Physical Exam  General: well developed, well nourished, no distress  Neurologic: Alert and oriented. Thought content appropriate.   GCS: Motor: 6/Verbal: 5/Eyes: 4 GCS Total: 15   Mental Status: Awake, Alert, Oriented x3   Cranial nerves: face symmetric, tongue midline, pupils equal, round, reactive to light with accomodation, EOMI.   Motor Strength: moves all extremities with good strength and tone   Sensation: response to light touch throughout  No gait disturbances      Diagnostic Results:  I have personally reviewed imaging and agree with the findings    CT head wo contrast (9/25/23): interval decrease in ventricular size, 3rd vent now measuring 6.8 mm in comparison to previous measurement of 8.7 from CT on 8/22/23     ASSESSMENT/PLAN:     22 y.o. 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 Johnson (Hakim @ 100).    -Imaging with good decompression of 3rd ventricle, unclear if patient is experiencing migraine flair vs low pressure headaches. Will have patient follow up to re-adjust shunt to 120 in clinic. If no improvement of headache with adjustment, will need to refer patient to headache neurology.   -Social work referral for job placement    Luci Tripathi PA-C  Neurosurgery   [facility]          Note dictated with voice recognition software, please excuse any grammatical errors.

## 2023-10-11 ENCOUNTER — HOSPITAL ENCOUNTER (OUTPATIENT)
Facility: HOSPITAL | Age: 23
Discharge: HOME | End: 2023-10-12
Attending: EMERGENCY MEDICINE | Admitting: HOSPITALIST
Payer: MEDICAID

## 2023-10-11 DIAGNOSIS — R20.0 RIGHT SIDED NUMBNESS: Primary | ICD-10-CM

## 2023-10-11 DIAGNOSIS — M54.12 RIGHT CERVICAL RADICULOPATHY: ICD-10-CM

## 2023-10-11 DIAGNOSIS — G45.9 TIA (TRANSIENT ISCHEMIC ATTACK): ICD-10-CM

## 2023-10-11 DIAGNOSIS — R53.1 RIGHT SIDED WEAKNESS: ICD-10-CM

## 2023-10-11 DIAGNOSIS — I63.9 STROKE: ICD-10-CM

## 2023-10-11 DIAGNOSIS — Q03.0 AQUEDUCTAL STENOSIS: ICD-10-CM

## 2023-10-11 LAB
ALBUMIN SERPL BCP-MCNC: 3.7 G/DL (ref 3.5–5.2)
ALP SERPL-CCNC: 94 U/L (ref 55–135)
ALT SERPL W/O P-5'-P-CCNC: 9 U/L (ref 10–44)
ANION GAP SERPL CALC-SCNC: 10 MMOL/L (ref 8–16)
AST SERPL-CCNC: 12 U/L (ref 10–40)
BASOPHILS # BLD AUTO: 0.03 K/UL (ref 0–0.2)
BASOPHILS NFR BLD: 0.3 % (ref 0–1.9)
BILIRUB SERPL-MCNC: 0.4 MG/DL (ref 0.1–1)
BUN SERPL-MCNC: 12 MG/DL (ref 6–20)
CALCIUM SERPL-MCNC: 8.8 MG/DL (ref 8.7–10.5)
CHLORIDE SERPL-SCNC: 109 MMOL/L (ref 95–110)
CO2 SERPL-SCNC: 23 MMOL/L (ref 23–29)
CREAT SERPL-MCNC: 1 MG/DL (ref 0.5–1.4)
DIFFERENTIAL METHOD: ABNORMAL
EOSINOPHIL # BLD AUTO: 0.1 K/UL (ref 0–0.5)
EOSINOPHIL NFR BLD: 1.3 % (ref 0–8)
ERYTHROCYTE [DISTWIDTH] IN BLOOD BY AUTOMATED COUNT: 14.3 % (ref 11.5–14.5)
EST. GFR  (NO RACE VARIABLE): >60 ML/MIN/1.73 M^2
GLUCOSE SERPL-MCNC: 95 MG/DL (ref 70–110)
HCT VFR BLD AUTO: 34.8 % (ref 37–48.5)
HGB BLD-MCNC: 11 G/DL (ref 12–16)
IMM GRANULOCYTES # BLD AUTO: 0.03 K/UL (ref 0–0.04)
IMM GRANULOCYTES NFR BLD AUTO: 0.3 % (ref 0–0.5)
INR PPP: 1 (ref 0.8–1.2)
LYMPHOCYTES # BLD AUTO: 2.7 K/UL (ref 1–4.8)
LYMPHOCYTES NFR BLD: 25.5 % (ref 18–48)
MCH RBC QN AUTO: 27 PG (ref 27–31)
MCHC RBC AUTO-ENTMCNC: 31.6 G/DL (ref 32–36)
MCV RBC AUTO: 85 FL (ref 82–98)
MONOCYTES # BLD AUTO: 0.9 K/UL (ref 0.3–1)
MONOCYTES NFR BLD: 8.3 % (ref 4–15)
NEUTROPHILS # BLD AUTO: 6.7 K/UL (ref 1.8–7.7)
NEUTROPHILS NFR BLD: 64.3 % (ref 38–73)
NRBC BLD-RTO: 0 /100 WBC
PLATELET # BLD AUTO: 301 K/UL (ref 150–450)
PMV BLD AUTO: 10.5 FL (ref 9.2–12.9)
POTASSIUM SERPL-SCNC: 3.8 MMOL/L (ref 3.5–5.1)
PROT SERPL-MCNC: 7.3 G/DL (ref 6–8.4)
PROTHROMBIN TIME: 10.7 SEC (ref 9–12.5)
RBC # BLD AUTO: 4.08 M/UL (ref 4–5.4)
SODIUM SERPL-SCNC: 142 MMOL/L (ref 136–145)
TSH SERPL DL<=0.005 MIU/L-ACNC: 0.84 UIU/ML (ref 0.4–4)
WBC # BLD AUTO: 10.45 K/UL (ref 3.9–12.7)

## 2023-10-11 PROCEDURE — 99285 EMERGENCY DEPT VISIT HI MDM: CPT | Mod: 25

## 2023-10-11 PROCEDURE — 99203 OFFICE O/P NEW LOW 30 MIN: CPT | Mod: 95,,, | Performed by: STUDENT IN AN ORGANIZED HEALTH CARE EDUCATION/TRAINING PROGRAM

## 2023-10-11 PROCEDURE — 84443 ASSAY THYROID STIM HORMONE: CPT | Performed by: EMERGENCY MEDICINE

## 2023-10-11 PROCEDURE — 80061 LIPID PANEL: CPT | Performed by: EMERGENCY MEDICINE

## 2023-10-11 PROCEDURE — 99203 PR OFFICE/OUTPT VISIT, NEW, LEVL III, 30-44 MIN: ICD-10-PCS | Mod: 95,,, | Performed by: STUDENT IN AN ORGANIZED HEALTH CARE EDUCATION/TRAINING PROGRAM

## 2023-10-11 PROCEDURE — 80053 COMPREHEN METABOLIC PANEL: CPT | Performed by: EMERGENCY MEDICINE

## 2023-10-11 PROCEDURE — 85610 PROTHROMBIN TIME: CPT | Performed by: EMERGENCY MEDICINE

## 2023-10-11 PROCEDURE — 85025 COMPLETE CBC W/AUTO DIFF WBC: CPT | Performed by: EMERGENCY MEDICINE

## 2023-10-11 NOTE — Clinical Note
Diagnosis: Stroke [627034]   Future Attending Provider: CAROLINA RAMON [[id number]]   Admitting Provider:: CAROLINA RAMON. [[id number]]

## 2023-10-12 VITALS
BODY MASS INDEX: 37.65 KG/M2 | DIASTOLIC BLOOD PRESSURE: 61 MMHG | RESPIRATION RATE: 19 BRPM | HEIGHT: 65 IN | TEMPERATURE: 98 F | SYSTOLIC BLOOD PRESSURE: 127 MMHG | HEART RATE: 71 BPM | OXYGEN SATURATION: 99 % | WEIGHT: 226 LBS

## 2023-10-12 PROBLEM — M54.12 RIGHT CERVICAL RADICULOPATHY: Status: ACTIVE | Noted: 2023-10-12

## 2023-10-12 LAB
AORTIC ROOT ANNULUS: 2.49 CM
ASCENDING AORTA: 2.5 CM
AV INDEX (PROSTH): 1
AV MEAN GRADIENT: 3 MMHG
AV PEAK GRADIENT: 7 MMHG
AV VALVE AREA BY VELOCITY RATIO: 2.15 CM²
AV VALVE AREA: 2.97 CM²
AV VELOCITY RATIO: 0.73
BSA FOR ECHO PROCEDURE: 2.17 M2
CHOLEST SERPL-MCNC: 130 MG/DL (ref 120–199)
CHOLEST/HDLC SERPL: 3 {RATIO} (ref 2–5)
CV ECHO LV RWT: 0.48 CM
DOP CALC AO PEAK VEL: 1.29 M/S
DOP CALC AO VTI: 23.7 CM
DOP CALC LVOT AREA: 3 CM2
DOP CALC LVOT DIAMETER: 1.94 CM
DOP CALC LVOT PEAK VEL: 0.94 M/S
DOP CALC LVOT STROKE VOLUME: 70.32 CM3
DOP CALC RVOT PEAK VEL: 0.85 M/S
DOP CALC RVOT VTI: 19.2 CM
DOP CALCLVOT PEAK VEL VTI: 23.8 CM
E WAVE DECELERATION TIME: 172.18 MSEC
E/A RATIO: 1.91
E/E' RATIO: 6.87 M/S
ECHO LV POSTERIOR WALL: 1.05 CM (ref 0.6–1.1)
EJECTION FRACTION: 60 %
ESTIMATED AVG GLUCOSE: 105 MG/DL (ref 68–131)
FRACTIONAL SHORTENING: 37 % (ref 28–44)
HBA1C MFR BLD: 5.3 % (ref 4–5.6)
HDLC SERPL-MCNC: 44 MG/DL (ref 40–75)
HDLC SERPL: 33.8 % (ref 20–50)
INTERVENTRICULAR SEPTUM: 0.93 CM (ref 0.6–1.1)
IVC DIAMETER: 1.49 CM
IVRT: 64.7 MSEC
LA MAJOR: 4.93 CM
LA MINOR: 4.57 CM
LA WIDTH: 3.2 CM
LDLC SERPL CALC-MCNC: 69.6 MG/DL (ref 63–159)
LEFT ATRIUM SIZE: 3.01 CM
LEFT ATRIUM VOLUME INDEX: 18.7 ML/M2
LEFT ATRIUM VOLUME: 38.83 CM3
LEFT INTERNAL DIMENSION IN SYSTOLE: 2.78 CM (ref 2.1–4)
LEFT VENTRICLE DIASTOLIC VOLUME INDEX: 42.09 ML/M2
LEFT VENTRICLE DIASTOLIC VOLUME: 87.54 ML
LEFT VENTRICLE MASS INDEX: 70 G/M2
LEFT VENTRICLE SYSTOLIC VOLUME INDEX: 14 ML/M2
LEFT VENTRICLE SYSTOLIC VOLUME: 29.08 ML
LEFT VENTRICULAR INTERNAL DIMENSION IN DIASTOLE: 4.4 CM (ref 3.5–6)
LEFT VENTRICULAR MASS: 145.79 G
LV LATERAL E/E' RATIO: 7.36 M/S
LV SEPTAL E/E' RATIO: 6.44 M/S
LVOT MG: 2.6 MMHG
LVOT MV: 0.79 CM/S
MV PEAK A VEL: 0.54 M/S
MV PEAK E VEL: 1.03 M/S
MV STENOSIS PRESSURE HALF TIME: 49.93 MS
MV VALVE AREA P 1/2 METHOD: 4.41 CM2
NONHDLC SERPL-MCNC: 86 MG/DL
PISA MRMAX VEL: 2.75 M/S
PISA TR MAX VEL: 2.82 M/S
PV MEAN GRADIENT: 2 MMHG
RA MAJOR: 3.6 CM
RA PRESSURE ESTIMATED: 3 MMHG
RA WIDTH: 2.5 CM
RV TB RVSP: 6 MMHG
STJ: 2.52 CM
TDI LATERAL: 0.14 M/S
TDI SEPTAL: 0.16 M/S
TDI: 0.15 M/S
TR MAX PG: 32 MMHG
TR MEAN GRADIENT: 24 MMHG
TRICUSPID ANNULAR PLANE SYSTOLIC EXCURSION: 1.78 CM
TRIGL SERPL-MCNC: 82 MG/DL (ref 30–150)
TROPONIN I SERPL DL<=0.01 NG/ML-MCNC: <0.006 NG/ML (ref 0–0.03)
TV REST PULMONARY ARTERY PRESSURE: 35 MMHG
Z-SCORE OF LEFT VENTRICULAR DIMENSION IN END DIASTOLE: -3.7
Z-SCORE OF LEFT VENTRICULAR DIMENSION IN END SYSTOLE: -2.67

## 2023-10-12 PROCEDURE — 97166 OT EVAL MOD COMPLEX 45 MIN: CPT

## 2023-10-12 PROCEDURE — 84484 ASSAY OF TROPONIN QUANT: CPT | Performed by: NURSE PRACTITIONER

## 2023-10-12 PROCEDURE — 25000003 PHARM REV CODE 250: Performed by: NURSE PRACTITIONER

## 2023-10-12 PROCEDURE — 92523 SPEECH SOUND LANG COMPREHEN: CPT

## 2023-10-12 PROCEDURE — 97530 THERAPEUTIC ACTIVITIES: CPT

## 2023-10-12 PROCEDURE — 83036 HEMOGLOBIN GLYCOSYLATED A1C: CPT | Performed by: NURSE PRACTITIONER

## 2023-10-12 PROCEDURE — G0378 HOSPITAL OBSERVATION PER HR: HCPCS

## 2023-10-12 PROCEDURE — 97162 PT EVAL MOD COMPLEX 30 MIN: CPT

## 2023-10-12 PROCEDURE — 96372 THER/PROPH/DIAG INJ SC/IM: CPT | Performed by: NURSE PRACTITIONER

## 2023-10-12 PROCEDURE — 92610 EVALUATE SWALLOWING FUNCTION: CPT

## 2023-10-12 PROCEDURE — 97116 GAIT TRAINING THERAPY: CPT

## 2023-10-12 PROCEDURE — 63600175 PHARM REV CODE 636 W HCPCS: Performed by: NURSE PRACTITIONER

## 2023-10-12 RX ORDER — SODIUM CHLORIDE 0.9 % (FLUSH) 0.9 %
10 SYRINGE (ML) INJECTION
Status: DISCONTINUED | OUTPATIENT
Start: 2023-10-12 | End: 2023-10-12 | Stop reason: HOSPADM

## 2023-10-12 RX ORDER — ONDANSETRON 2 MG/ML
4 INJECTION INTRAMUSCULAR; INTRAVENOUS EVERY 6 HOURS PRN
Status: DISCONTINUED | OUTPATIENT
Start: 2023-10-12 | End: 2023-10-12 | Stop reason: HOSPADM

## 2023-10-12 RX ORDER — LABETALOL HYDROCHLORIDE 5 MG/ML
10 INJECTION, SOLUTION INTRAVENOUS
Status: DISCONTINUED | OUTPATIENT
Start: 2023-10-12 | End: 2023-10-12 | Stop reason: HOSPADM

## 2023-10-12 RX ORDER — ENOXAPARIN SODIUM 100 MG/ML
40 INJECTION SUBCUTANEOUS EVERY 24 HOURS
Status: DISCONTINUED | OUTPATIENT
Start: 2023-10-12 | End: 2023-10-12 | Stop reason: HOSPADM

## 2023-10-12 RX ORDER — ASPIRIN 325 MG
325 TABLET ORAL ONCE
Status: COMPLETED | OUTPATIENT
Start: 2023-10-12 | End: 2023-10-12

## 2023-10-12 RX ORDER — ASPIRIN 81 MG/1
81 TABLET ORAL DAILY
Status: DISCONTINUED | OUTPATIENT
Start: 2023-10-13 | End: 2023-10-12 | Stop reason: HOSPADM

## 2023-10-12 RX ADMIN — ENOXAPARIN SODIUM 40 MG: 40 INJECTION SUBCUTANEOUS at 05:10

## 2023-10-12 RX ADMIN — ASPIRIN 325 MG ORAL TABLET 325 MG: 325 PILL ORAL at 02:10

## 2023-10-12 NOTE — HPI
"Earl Mcelroy is a 23 y.o. female with a PMH  has a past medical history of Aqueductal stenosis.  Presented to the ER for evaluation right-sided neck pain as well as right upper and right lower extremity numbness/tingling.  Patient reports his symptoms started shortly after getting work between 2300 and 0300 on (10/11/2023).  Denies any injury or trauma which may have caused the onset of her symptoms.  However, patient does state that she had  shunt placed on 08/08/2023, which runs down right side of neck, at [facility] by Dr. Khoa Johnson for aqueductal stenosis.  Patient reports neck pain has resolved but still has numbness/tingling to her right upper and lower extremity.  Vascular Neurology  (Dr. Slater) was consulted by ED and recommended CT of the head and MRI if initial imaging is negative.  However, patient has a programmable shunt and it is unknown if patient can have MRI performed at our facility.  Neurosurgery on-call provider in [place] was consulted by ER provider. Per ER provider's documentation: "Discussed pt's case with Dr. Benavides (Neurosurgery) who states that the pt does not need to be transferred to [facility] for shunt evaluation tonight".  Patient denies any other associated stroke-like symptoms such as dysarthria, dysphagia, facial droop or extremity weakness/drift.  Denies any other symptoms at this time.    Blood work ER was unremarkable.  Shunt series, CT head, and CT of C-spine was negative for acute findings.  Hospital Medicine consulted to admit patient for further stroke workup and repeat CT of the head per vascular Neurology/Neurosurgery recommendations.  Patient in agreement with treatment plan.  Patient will be admitted under observation status.    Per surgical note: " A Codman Hakim valve was obtained and set at 100".    PCP: Jalyn Castillo  "

## 2023-10-12 NOTE — PT/OT/SLP EVAL
Physical Therapy Evaluation and Treatment    Patient Name: Earl Mcelroy   MRN: [id number]  Recent Surgery: * No surgery found *      Recommendations:     Discharge Recommendations: outpatient PT   Discharge Equipment Recommendations: none   Barriers to discharge: None    Assessment:     Earl Mcelroy is a 23 y.o. female admitted with a medical diagnosis of Right sided numbness. She presents with the following impairments/functional limitations: weakness, impaired endurance, impaired functional mobility, gait instability, impaired balance, pain, decreased safety awareness, decreased lower extremity function, decreased coordination, impaired cardiopulmonary response to activity, impaired sensation, edema.    Rehab Prognosis: Good; patient would benefit from acute PT services to address these deficits and reach maximum level of function.    Plan:     During this hospitalization, patient to be seen 3 x/week to address the above listed problems via gait training, therapeutic activities, therapeutic exercises    Plan of Care Expires: 10/26/23    Subjective     Chief Complaint: Pt c/o B LE and R UE numbness/tingling  Patient Comments/Goals: none stated  Pain/Comfort:  Pain Rating 1: 0/10    Social History:  Living Environment: Patient lives with their boyfriend and his mother  in a single story home with number of outside stair(s): 0  Prior Level of Function: Prior to admission, patient was independent, driving, working, and going to school, and ambulated household and community distances using no AD  Equipment Used at Home: none  DME owned (not currently used): none  Assistance Upon Discharge: significant other    Objective:     Communicated with nurse and epic chart review prior to session. Patient found HOB elevated with peripheral IV, telemetry upon PT entry to room.    General Precautions: Standard, fall   Orthopedic Precautions: N/A   Braces: N/A    Respiratory Status: Room air    Exams:  Cognition:  "Patient is oriented to Person, Place, Time, Situation  RLE ROM: WFL  RLE Strength:  Grossly 3+/5  LLE ROM: WFL  LLE Strength:  Grossly 4+/5  Sensation:    -       Impaired  R UE and B LE, reports numbness from hip down on R LE, tingling from knee down on L LE that started today  Skin Integrity/Edema:     -       Skin integrity: Visible skin intact  -       Edema: Mild R UE    Functional Mobility:  Gait belt applied - Yes  Bed Mobility  Rolling Right: modified independence  Scooting: modified independence  Supine to Sit: modified independence  Transfers  Sit to Stand: stand by assistance with no AD  Bed to Chair: contact guard assistance with no AD using Step Transfer  Gait  Patient ambulated 180ft with no AD and contact guard assistance. Patient demonstrates occasional unsteady gait. No c/o SOB, c/o mild dizziness upon sitting but improved with time, no gross LOB. All lines remained intact throughout ambulation trail.  Balance  Sitting: modified independence  Standing: contact guard assistance    Therapeutic Activities and Exercises:   Pt educated on role of PT in acute care and POC. Educated on importance of OOB activities, activity pacing, and HEP (marching/hip flex, hip abd, heel slides/LAQ, quad sets, ankle pumps) in order to maintain/regain strength. Encouraged to sit up in chair for all meals. Educated on proper use of RW for safety and to reduce risk of falling. Educated on "call don't fall" policy and increased risk of falling due to weakness, instructed to utilize call bell for assistance with all transfers. Pt agreeable to all requests.    AM-PAC 6 CLICK MOBILITY  Total Score:18    Patient left up in chair with all lines intact, call button in reach, and chair alarm on.    GOALS:   Multidisciplinary Problems       Physical Therapy Goals          Problem: Physical Therapy    Goal Priority Disciplines Outcome Goal Variances Interventions   Physical Therapy Goal     PT, PT/OT      Description: Goals to be met " by 10/26/23.  1. Pt will complete bed mobility INDEP.  2. Pt will complete sit to stand MOD I.  3. Pt will ambulate 200ft MOD I.  4. Pt will increase AMPAC score by 2 points to progress functional mobility.                       History:     Past Medical History:   Diagnosis Date    Aqueductal stenosis        Past Surgical History:   Procedure Laterality Date    ENDOSCOPIC INSERTION OF VENTRICULOPERITONEAL SHUNT N/A 8/8/2023    Procedure: INSERTION, SHUNT, VENTRICULOPERITONEAL, ENDOSCOPIC;  Surgeon: Khoa Johnson MD;  Location: [address];  Service: Neurosurgery;  Laterality: N/A;    VENTRICULOPERITONEAL SHUNT Right 8/8/2023    Procedure: INSERTION, SHUNT, VENTRICULOPERITONEAL;  Surgeon: Khoa Johnson MD;  Location: [address];  Service: Neurosurgery;  Laterality: Right;  R side ventriculoperitoneal shunt placement       Time Tracking:     PT Received On: 10/12/23  PT Start Time: 1100  PT Stop Time: 1125  PT Total Time (min): 25 min     Billable Minutes: Evaluation 15min and Gait Training 10min    10/12/2023

## 2023-10-12 NOTE — ASSESSMENT & PLAN NOTE
Plan:  -Repeat CT of head  -MRI of brain if device is compatible   -lipid panel  -A1c  -Echo   -Carotid US  -Neuro checks q4hrs  -PT/OT  Neurology consult if warranted

## 2023-10-12 NOTE — PLAN OF CARE
OT eval complete. Recommends outpatient OT.  Pt Mod (I) for bed mobility and CGA for t/fs and ambulation 180ft with no AD. (I) with ADLs. Strength: LUE>RUE.  Pt does not require skilled acute OT services at this time.  Discharge from OT. Defer to PT for gait and t/fs.

## 2023-10-12 NOTE — NURSING TRANSFER
Nursing Transfer Note      10/12/2023   9:19 AM    Nurse giving handoff:krissy     Nurse receiving handoff:layne longoria    Reason patient is being transferred: obs    Transfer From: ed      Transfer via wheelchair    Transfer with     Transported by krissy    Transfer Vital Signs:  Blood Pressure:124/72  Heart Rate:66  O2:98  Temperature:98.2  Respirations:18    Telemetry: Box Number [id number], Rate 66, Rhythm nrs, and Telemetry     Order for Tele Monitor? Yes    Additional Lines:     4eyes on Skin: yes    Medicines sent: na    Any special needs or follow-up needed: na    Patient belongings transferred with patient: Yes    Chart send with patient: Yes    Notified: spouse    Patient reassessed at: 10/12/23 0900 (date, time)  1  Upon arrival to floor: cardiac monitor applied, patient oriented to room, call bell in reach, and bed in lowest position

## 2023-10-12 NOTE — HPI
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 These symptoms have never occurred before.  Symptoms began sometime between 11pm last night and 3am today.    /70

## 2023-10-12 NOTE — PLAN OF CARE
[facility] - Telemetry (Hospital)  Initial Discharge Assessment       Primary Care Provider: Jalyn Castillo FNP    Admission Diagnosis: TIA (transient ischemic attack) [G45.9]  Stroke [I63.9]  Right sided weakness [R53.1]  Right sided numbness [R20.0]    Admission Date: 10/11/2023  Expected Discharge Date:     Transition of Care Barriers: None    Payor: MEDICAID / Plan: formerly [facility] CONNECT / Product Type: Managed Medicaid /     Extended Emergency Contact Information  Primary Emergency Contact: Lonnie Ying  Address: [address] [place] [address] [address]           [place] [address] [place]  Mobile Phone: [phone]  Relation: Significant other    Discharge Plan A: Home, Home with family         [facility] [address] [place] [address]  Phone: [phone] Fax: [phone]    [id number] - [place] - [address] [facility]  [address] [place] [address]  Phone: [phone] Fax: [phone]      Initial Assessment (most recent)       Adult Discharge Assessment - 10/12/23 1506          Discharge Assessment    Assessment Type Discharge Planning Assessment     Confirmed/corrected address, phone number and insurance Yes     Confirmed Demographics Correct on Facesheet     Source of Information patient     Communicated STEPHANIE with patient/caregiver Date not available/Unable to determine     Reason For Admission Right sided numbness     People in Home significant other     Facility Arrived From: NA     Do you expect to return to your current living situation? Yes     Do you have help at home or someone to help you manage your care at home? Yes     Who are your caregiver(s) and their phone number(s)? Pt's s/o, Lonnie     Prior to hospitilization cognitive status: Alert/Oriented     Current cognitive status: Alert/Oriented     Equipment Currently Used at Home none     Readmission  within 30 days? No     Patient currently being followed by outpatient case management? No     Do you currently have service(s) that help you manage your care at home? No     Who is going to help you get home at discharge? S/o, Lonnie     How do you get to doctors appointments? family or friend will provide     Are you on dialysis? No     Do you take coumadin? No     DME Needed Upon Discharge  none     Discharge Plan discussed with: Patient     Transition of Care Barriers None     Discharge Plan A Home;Home with family                   No anticipated needs at this time. CM to follow.

## 2023-10-12 NOTE — ASSESSMENT & PLAN NOTE
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    NIHSS 2     CT head not done.    Not a candidate for thrombolytics as she is OOW.  Suspicion for an ischemic stroke is low.   Recommend CT head.  If normal, it is reasonable to obtain an MRI of the brain without contrast given that she is never had these symptoms before and she is a diabetic.  If MRI brain normal, consider a C-spine etiology.  Defer starting any antiplatelets for now until MRI confirms presence of acute ischemic stroke. Permissive HTN w/ SBP <220 until then.

## 2023-10-12 NOTE — ED PROVIDER NOTES
SCRIBE #1 NOTE: Viviana MENARD am scribing for, and in the presence of, Bright Lees MD. I have scribed the entire note.       History     Chief Complaint   Patient presents with    Neck Pain     Pt had  shunt surgery  8/08/23 pt started having R sided neck pain and extremity numbness and tingling last night. Pt had cp yesterday after leaving work but that has subsided     Review of patient's allergies indicates:  No Known Allergies      History of Present Illness     HPI    10/11/2023, 9:00 PM  History obtained from the patient      History of Present Illness: Earl Mcelroy is a 23 y.o. female patient who presents to the Emergency Department for evaluation of right sided neck pain which onset last night. The pt had a  shunt placed on 8/8/23 after she was Dx with Hydrocephalus secondary to aqueductal stenosis. The pt reports numbness, paresthesia, and weakness to the RUE and RLE which onset between 23:00 and 03:00 last night. Symptoms are constant and moderate in severity. No mitigating or exacerbating factors reported. Patient denies any speech difficulty, facial asymmetry, fever, CP, SOB, and all other sxs at this time. No prior Tx reported. Stroke alert has been initiated at 20:25. No further complaints or concerns at this time.       Arrival mode: Personal vehicle    PCP: Jalyn Castillo FNP        Past Medical History:  No past medical history on file.    Past Surgical History:  Past Surgical History:   Procedure Laterality Date    ENDOSCOPIC INSERTION OF VENTRICULOPERITONEAL SHUNT N/A 8/8/2023    Procedure: INSERTION, SHUNT, VENTRICULOPERITONEAL, ENDOSCOPIC;  Surgeon: Khoa Johnson MD;  Location: [address];  Service: Neurosurgery;  Laterality: N/A;    VENTRICULOPERITONEAL SHUNT Right 8/8/2023    Procedure: INSERTION, SHUNT, VENTRICULOPERITONEAL;  Surgeon: Khoa Johnson MD;  Location: [facility] OR [address] [place] [address];  Service: Neurosurgery;  Laterality: Right;  R side ventriculoperitoneal shunt  placement         Family History:  No family history on file.    Social History:  Social History     Tobacco Use    Smoking status: Not on file    Smokeless tobacco: Not on file   Substance and Sexual Activity    Alcohol use: Not on file    Drug use: Not on file    Sexual activity: Not on file        Review of Systems     Review of Systems   Constitutional:  Negative for fever.   HENT:  Negative for sore throat.    Respiratory:  Negative for shortness of breath.    Cardiovascular:  Negative for chest pain.   Gastrointestinal:  Negative for nausea.   Genitourinary:  Negative for dysuria.   Musculoskeletal:  Positive for neck pain (right-sided). Negative for back pain.   Skin:  Negative for rash.   Neurological:  Positive for weakness (RUE and RLE) and numbness (RUE and RLE). Negative for facial asymmetry and speech difficulty.        [+] paresthesia (RUE and RLE)   Hematological:  Does not bruise/bleed easily.   All other systems reviewed and are negative.     Physical Exam     Initial Vitals [10/11/23 1908]   BP Pulse Resp Temp SpO2   114/70 91 18 97.1 °F (36.2 °C) 97 %      MAP       --          Physical Exam  Nursing Notes and Vital Signs Reviewed.  Constitutional: Patient is in no acute distress. Well-developed and well-nourished.  Head: Atraumatic. Normocephalic.  shunt palpated in the posterior right scalp.   Eyes: PERRL. EOM intact. Conjunctivae are not pale. No scleral icterus.  ENT: Mucous membranes are moist. Oropharynx is clear and symmetric.   Neck: Supple. Full ROM. No lymphadenopathy. Tenderness over the shunt tubing in the right neck. No cervical midline bony tenderness, deformities, or step-offs.   Cardiovascular: Regular rate. Regular rhythm. No murmurs, rubs, or gallops. Distal pulses are 2+ and symmetric.  Pulmonary/Chest: No respiratory distress. Clear to auscultation bilaterally. No wheezing or rales.  Abdominal: Soft and non-distended.  There is no tenderness.  No rebound, guarding, or  rigidity. Good bowel sounds.  Genitourinary: No CVA tenderness  Musculoskeletal: Moves all extremities. No obvious deformities. No edema. No calf tenderness.  Skin: Warm and dry.  Neurological:  Alert, awake, and appropriate.  Normal speech.  Decreased  strength to the RUE with mild drift. Decreased sensation to the RUE and RLE. 4/5 strength to the RLE with mild drift.   Psychiatric: Normal affect. Good eye contact. Appropriate in content.     ED Course   Critical Care    Date/Time: 10/12/2023 7:46 AM    Performed by: Bright Lees MD  Authorized by: Bright Lees MD  Direct patient critical care time: 6 minutes  Additional history critical care time: 7 minutes  Ordering / reviewing critical care time: 8 minutes  Documentation critical care time: 5 minutes  Consulting other physicians critical care time: 6 minutes  Consult with family critical care time: 7 minutes  Other critical care time: 4 minutes  Total critical care time (exclusive of procedural time) : 43 minutes  Critical care time was exclusive of separately billable procedures and treating other patients and teaching time.  Critical care was necessary to treat or prevent imminent or life-threatening deterioration of the following conditions: CNS failure or compromise.  Critical care was time spent personally by me on the following activities: development of treatment plan with patient or surrogate, discussions with consultants, blood draw for specimens, interpretation of cardiac output measurements, evaluation of patient's response to treatment, examination of patient, obtaining history from patient or surrogate, ordering and review of laboratory studies, ordering and review of radiographic studies, ordering and performing treatments and interventions, pulse oximetry, re-evaluation of patient's condition and review of old charts.        ED Vital Signs:  Vitals:    10/11/23 1908 10/11/23 2100 10/11/23 2130 10/11/23 2230   BP: 114/70 118/70 115/62  "119/70   Pulse: 91 81 77 72   Resp: 18 19 12    Temp: 97.1 °F (36.2 °C)      TempSrc: Oral      SpO2: 97% 98% 97% 97%   Weight: 102.7 kg (226 lb 8.4 oz)      Height: 5' 5" (1.651 m)       10/11/23 2330 10/12/23 0100   BP: 123/73 129/81   Pulse: 74 73   Resp:     Temp:     TempSrc:     SpO2: 98% 98%   Weight:     Height:         Abnormal Lab Results:  Labs Reviewed   CBC W/ AUTO DIFFERENTIAL - Abnormal; Notable for the following components:       Result Value    Hemoglobin 11.0 (*)     Hematocrit 34.8 (*)     MCHC 31.6 (*)     All other components within normal limits   COMPREHENSIVE METABOLIC PANEL - Abnormal; Notable for the following components:    ALT 9 (*)     All other components within normal limits   PROTIME-INR   TSH   LIPID PANEL   PREGNANCY TEST, URINE RAPID   POCT GLUCOSE, HAND-HELD DEVICE        All Lab Results:  Results for orders placed or performed during the hospital encounter of 10/11/23   CBC W/ AUTO DIFFERENTIAL   Result Value Ref Range    WBC 10.45 3.90 - 12.70 K/uL    RBC 4.08 4.00 - 5.40 M/uL    Hemoglobin 11.0 (L) 12.0 - 16.0 g/dL    Hematocrit 34.8 (L) 37.0 - 48.5 %    MCV 85 82 - 98 fL    MCH 27.0 27.0 - 31.0 pg    MCHC 31.6 (L) 32.0 - 36.0 g/dL    RDW 14.3 11.5 - 14.5 %    Platelets 301 150 - 450 K/uL    MPV 10.5 9.2 - 12.9 fL    Immature Granulocytes 0.3 0.0 - 0.5 %    Gran # (ANC) 6.7 1.8 - 7.7 K/uL    Immature Grans (Abs) 0.03 0.00 - 0.04 K/uL    Lymph # 2.7 1.0 - 4.8 K/uL    Mono # 0.9 0.3 - 1.0 K/uL    Eos # 0.1 0.0 - 0.5 K/uL    Baso # 0.03 0.00 - 0.20 K/uL    nRBC 0 0 /100 WBC    Gran % 64.3 38.0 - 73.0 %    Lymph % 25.5 18.0 - 48.0 %    Mono % 8.3 4.0 - 15.0 %    Eosinophil % 1.3 0.0 - 8.0 %    Basophil % 0.3 0.0 - 1.9 %    Differential Method Automated    Comprehensive metabolic panel   Result Value Ref Range    Sodium 142 136 - 145 mmol/L    Potassium 3.8 3.5 - 5.1 mmol/L    Chloride 109 95 - 110 mmol/L    CO2 23 23 - 29 mmol/L    Glucose 95 70 - 110 mg/dL    BUN 12 6 - 20 mg/dL "    Creatinine 1.0 0.5 - 1.4 mg/dL    Calcium 8.8 8.7 - 10.5 mg/dL    Total Protein 7.3 6.0 - 8.4 g/dL    Albumin 3.7 3.5 - 5.2 g/dL    Total Bilirubin 0.4 0.1 - 1.0 mg/dL    Alkaline Phosphatase 94 55 - 135 U/L    AST 12 10 - 40 U/L    ALT 9 (L) 10 - 44 U/L    eGFR >60 >60 mL/min/1.73 m^2    Anion Gap 10 8 - 16 mmol/L   Protime-INR   Result Value Ref Range    Prothrombin Time 10.7 9.0 - 12.5 sec    INR 1.0 0.8 - 1.2   TSH   Result Value Ref Range    TSH 0.845 0.400 - 4.000 uIU/mL         Imaging Results:  Imaging Results              X-Ray Shunt Series (Final result)  Result time 10/11/23 22:22:54      Final result by Fabian Joseph MD (10/11/23 22:22:54)                   Impression:      No acute abnormality.    No discontinuity   of the  shunt as far as seen      Electronically signed by: Fabian Joseph  Date:    10/11/2023  Time:    22:22               Narrative:    EXAMINATION:  XR SHUNT SERIES    CLINICAL HISTORY:  XR SHUNT SERIES    COMPARISON:  None    FINDINGS:  Multiple radiographic views  were obtained.    No evidence of acute fracture or dislocation.  Bony mineralization is normal.  Soft tissues are unremarkable. The right-sided ventricular peritoneal catheter appears unremarkable and extends to the left hemipelvis at the distal tip.                                       CT Cervical Spine Without Contrast (Final result)  Result time 10/11/23 22:16:35      Final result by Fabian Joseph MD (10/11/23 22:16:35)                   Impression:      No acute fracture or dislocation.    Visualized right-sided  catheter is intact    All CT scans   are performed using dose optimization techniques including the following: automated exposure control; adjustment of the mA and/or kV; use of iterative reconstruction technique.  Dose modulation was employed for ALARA by means of: Automated exposure control; adjustment of the mA and/or kV according to patient size (this includes techniques or standardized protocols  for targeted exams where dose is matched to indication/reason for exam; i.e. extremities or head); and/or use of iterative reconstructive technique.      Electronically signed by: Fabian Joseph  Date:    10/11/2023  Time:    22:16               Narrative:    EXAMINATION:  CT CERVICAL SPINE WITHOUT CONTRAST    CLINICAL HISTORY:  Neck pain, acute, prior cervical surgery;    TECHNIQUE:  Low dose axial images, sagittal and coronal reformations were performed though the cervical spine.  Contrast was not administered.    COMPARISON:  None    FINDINGS:  Normal vertebral body heights without evidence for spondylolisthesis.  Joint spaces are preserved.  No prevertebral soft tissue swelling.  Facet joints are congruent.  Normal bone mineral density.  A right-sided shunt catheter                                       CT Head Without Contrast (Edited Result - FINAL)  Result time 10/11/23 22:27:48   Procedure changed from CTA Head and Neck (xpd)     Addendum (preliminary) 1 of 1 by Fabian Joseph MD (10/11/23 22:27:48)      Mild prominence of lateral ventricles stable compared to prior exam of 9/25/23. Posterior approach right ventricular ostomy with tip in the left lateral ventricle. Third ventricle is stable measuring 6 mm.      Electronically signed by: Fabian Joseph  Date:    10/11/2023  Time:    22:27                 Final result by Fabian Joseph MD (10/11/23 22:14:01)                   Impression:      Mild lateral ventricles.  Posterior approach right ventricular ostomy with tip in the left lateral ventricle.  Third ventricle is stable measuring 6 mm.    No hemorrhage mass effect or midline shift.    All CT scans   are performed using dose optimization techniques including the following: automated exposure control; adjustment of the mA and/or kV; use of iterative reconstruction technique.  Dose modulation was employed for ALARA by means of: Automated exposure control; adjustment of the mA and/or kV according to patient  size (this includes techniques or standardized protocols for targeted exams where dose is matched to indication/reason for exam; i.e. extremities or head); and/or use of iterative reconstructive technique.      Electronically signed by: Fabian Joseph  Date:    10/11/2023  Time:    22:14               Narrative:    EXAMINATION:  CT HEAD WITHOUT CONTRAST    CLINICAL HISTORY:  Stroke/TIA, determine embolic source;    TECHNIQUE:  Low dose axial CT images obtained throughout the head without intravenous contrast. Sagittal and coronal reconstructions were performed.    COMPARISON:  None.    FINDINGS:  Mild lateral ventricles.  Posterior approach right ventricular ostomy with tip in the left lateral ventricle.  Third ventricle is stable measuring 6 mm.    No extra-axial blood or fluid collections.    No parenchymal mass, hemorrhage, edema or major vascular distribution infarct.    Skull/extracranial contents (limited evaluation): No fracture. Mastoid air cells and paranasal sinuses are essentially clear.                                       The EKG was ordered, reviewed, and independently interpreted by the ED provider.  Interpretation time: 20:32  Rate: 73 BPM  Rhythm: normal sinus rhythm  Interpretation: Minimal voltage criteria for LCH, may be normal variant (R in aVL). Nonspecific T wave abnormality. No STEMI.           The Emergency Provider reviewed the vital signs and test results, which are outlined above.     ED Discussion     8:25 PM: Stroke alert initiated at this time.     12:16 AM: Discussed pt's case with Dr. Slater (Vascular Neurology) to determine the need for transfer for programmable shunt MRI. Dr. Slater recommends keeping the pt here and repeat CT in the morning pending Neurosurgery recommendations.    1:04 AM: Discussed pt's case with Dr. Benavides (Neurosurgery) who states that the pt does not need to be transferred to [facility] for shunt evaluation tonight.    1:30 AM: Discussed case with Loy Larkin MD  ([facility]). Dr. Larkin agrees with current care and management of pt and accepts admission.   Admitting Service: [facility]  Admitting Physician: Dr. Larkin  Admit to: Obs med/tele    1:30 AM: Re-evaluated pt. I have discussed test results, shared treatment plan, and the need for admission with patient and family at bedside. Pt and family express understanding at this time and agree with all information. All questions answered. Pt and family have no further questions or concerns at this time. Pt is ready for admit.    ED Course as of 10/12/23 0130   Wed Oct 11, 2023   2109 Stroke unlikely per vascular neurology. No TNKase. Rec,d MRI/MRA [BA]   2244 DDx includes CVA, Cervical lesion, Infection [BA]   u Oct 12, 2023   0055 Discussed with Dr. Slater ([facility]). Would not need transport just for MRI, can repeat CT scan here in the AM. Recommended discussion with NSGY.  [BA]   0100 I discussed the case with NSGY Dr. Benavides. Does not see a reason for transport for shunt issues, ventricles are stable to improved. If shunt was malfunctioning, patient would have different presentaiton. Neck pain is common after this surgery. Can f/u in clinic for shunt evaluation. No need for transfer for shunt evaluation.  [BA]      ED Course User Index  [BA] Bright Lees MD     Medical Decision Making  Amount and/or Complexity of Data Reviewed  Labs: ordered. Decision-making details documented in ED Course.  Radiology: ordered. Decision-making details documented in ED Course.  ECG/medicine tests: ordered and independent interpretation performed. Decision-making details documented in ED Course.    Risk  Decision regarding hospitalization.       Additional MDM:     NIH Stroke Scale:   Interval = baseline (upon arrival/admit)  Level of consciousness = 0 - alert  LOC questions = 0 - answers both correctly  LOC commands = 0 - performs both correctly  Best gaze = 0 - normal  Visual = 0 - no visual loss  Facial palsy = 0 -  normal  Motor left arm =  0 - no drift  Motor right arm =  1 - drift  Motor left leg = 0 - no drift  Motor right leg =  1 - drift  Limb ataxia = 0 - absent  Sensory = 1 - mild to moderate loss  Best language = 0 - no aphasia  Dysarthria = 0 - normal articulation  Extinction and inattention = 0 - no neglect  NIH Stroke Scale Total = 3              ED Medication(s):  Medications - No data to display    New Prescriptions    No medications on file               Scribe Attestation:   Scribe #1: I performed the above scribed service and the documentation accurately describes the services I performed. I attest to the accuracy of the note.     Attending:   Physician Attestation Statement for Scribe #1: I, Bright Lees MD, personally performed the services described in this documentation, as scribed by Viviana Bhandari, in my presence, and it is both accurate and complete.           Clinical Impression       ICD-10-CM ICD-9-CM   1. Right sided numbness  R20.0 782.0   2. Stroke  I63.9 434.91       Disposition:   Disposition: Placed in Observation  Condition: Bright Salazar MD  10/12/23 0746

## 2023-10-12 NOTE — DISCHARGE SUMMARY
"[facility] ([facility] Medicine  Discharge Summary      Patient Name: Earl Mcelroy  MRN: [id number]  [facility]: [id number]  Patient Class: OP- Observation  Admission Date: 10/11/2023  Hospital Length of Stay: 0 days  Discharge Date and Time: 10/12/2023 @ 1810  Attending Physician: Daniel Pinedo MD   Discharging Provider: Cecilia Giron NP  Primary Care Provider: Jalyn Castillo FNP    Primary Care Team: Networked reference to record PCT     HPI:   Earl Mcelroy is a 23 y.o. female with a PMH  has a past medical history of Aqueductal stenosis.  Presented to the ER for evaluation right-sided neck pain as well as right upper and right lower extremity numbness/tingling.  Patient reports his symptoms started shortly after getting work between 2300 and 0300 on (10/11/2023).  Denies any injury or trauma which may have caused the onset of her symptoms.  However, patient does state that she had  shunt placed on 08/08/2023, which runs down right side of neck, at [facility] by Dr. Khoa Johnson for aqueductal stenosis.  Patient reports neck pain has resolved but still has numbness/tingling to her right upper and lower extremity.  Vascular Neurology  (Dr. Slater) was consulted by ED and recommended CT of the head and MRI if initial imaging is negative.  However, patient has a programmable shunt and it is unknown if patient can have MRI performed at our facility.  Neurosurgery on-call provider in [place] was consulted by ER provider. Per ER provider's documentation: "Discussed pt's case with Dr. Benavides (Neurosurgery) who states that the pt does not need to be transferred to [facility] for shunt evaluation tonight".  Patient denies any other associated stroke-like symptoms such as dysarthria, dysphagia, facial droop or extremity weakness/drift.  Denies any other symptoms at this time.    Blood work ER was unremarkable.  Shunt series, CT head, and CT of C-spine was negative for acute findings.  " "Hospital Medicine consulted to admit patient for further stroke workup and repeat CT of the head per vascular Neurology/Neurosurgery recommendations.  Patient in agreement with treatment plan.  Patient will be admitted under observation status.    Per surgical note: " A Codman Hakim valve was obtained and set at 100".    PCP: Jalyn Castillo      * No surgery found *      Hospital Course:   24 y/o female admitted with c/o right sided numbness and tingling with associated weakness that started prior to admission. She denies any injury or trauma. Initial CT head was negative. Patient recently had a  shunt placed for aqueductal stenosis on 8/8/2023 by Dr. Koha Johnson in [place]. She also complains of neck pain. She was admitted for observation.   CUS was negative for stenosis. XR shunt series was negative for acute findings. CT cervical spine no acute fracture or dislocation. Visualized right sided  catheter is intact.  Repeat CT head done and it was also negative.   PT/OT evaluated patient and she was able to ambulate 180' with no AD and contact guard assistance; recommended outpatient PT/OT.    She is to follow up with PCP in 3-5 days.   Follow [place]. [place] for evaluation with 1 week.     Patient seen and examined on the day of discharge.  All questions and concerns were addressed prior to discharge.    Face to face encounter with patient: 34 minutes     Goals of Care Treatment Preferences:  Code Status: Full Code      Consults:   Consults (From admission, onward)          Status Ordering Provider     IP consult to case management/social work  Once        Provider:  (Not yet assigned)    Completed TRISH RAMON     Consult to Telemedicine - Acute Stroke  Once        Provider:  (Not yet assigned)    Completed SALVATORE BAEZ  * Right sided numbness  -Repeat CT of head was negative  -lipid panel - LDL 69.6  -A1c 5.3  -Echo EF 55-70% normal systolic and diastolic " function  -Carotid US no significant stenosis seen  -Neuro checks q4hrs  -PT/OT recommended home with OP PT/OT    Right cervical radiculopathy  -S/P  shunt placement on 8/8/23 down right side. + Spurling's test with associated right-sided weakness.  -CT cervical spine no acute fracture or dislocation; visualized right sided  catheter in intact    Aqueductal stenosis  - Shunt placed by Dr. Khoa Johnson at [facility] on 8/8/23  -XR shunt series shows  shunt intact  -f/u OP for evaluation      Final Active Diagnoses:    Diagnosis Date Noted POA    PRINCIPAL PROBLEM:  Right sided numbness [R20.0] 10/11/2023 Yes    Right cervical radiculopathy [M54.12] 10/12/2023 Yes    Aqueductal stenosis [Q03.0] 08/05/2023 Not Applicable      Problems Resolved During this Admission:       Discharged Condition: good    Disposition: Home or Self Care    Follow Up:   Follow-up Information       Jalyn Casitllo FNP. Schedule an appointment as soon as possible for a visit in 3 day(s).    Specialty: Family Medicine  Why: call office and schedule a hospital follow up in 3-5 days  Contact information:  [address] [place] [address] [phone]               Khoa Johnson MD. Schedule an appointment as soon as possible for a visit in 1 week(s).    Specialties: Neurosurgery, Spine Surgery  Why: call office and schedule a hospital follow up within 1 week  Contact information:  [address]  [place] [address] [phone]                           Patient Instructions:      Ambulatory referral/consult to Physical/Occupational Therapy   Standing Status: Future   Referral Priority: Routine Referral Type: Physical Medicine   Referral Reason: Specialty Services Required   Number of Visits Requested: 1     Diet diabetic     Activity as tolerated       Significant Diagnostic Studies: Labs: CMP   Recent Labs   Lab 10/11/23  2044      K 3.8      CO2 23   GLU 95   BUN 12   CREATININE 1.0   CALCIUM 8.8    PROT 7.3   ALBUMIN 3.7   BILITOT 0.4   ALKPHOS 94   AST 12   ALT 9*   ANIONGAP 10    and CBC   Recent Labs   Lab 10/11/23  2044   WBC 10.45   HGB 11.0*   HCT 34.8*          Pending Diagnostic Studies:       Procedure Component Value Units Date/Time    Pregnancy, urine rapid [[id number]] Collected: 10/11/23 2047    Order Status: Sent Lab Status: In process Updated: 10/11/23 2048    Specimen: Urine, Clean Catch            Medications:  Reconciled Home Medications:      Medication List        CONTINUE taking these medications      metFORMIN 500 MG tablet  Commonly known as: GLUCOPHAGE  Take 500 mg by mouth once.              Indwelling Lines/Drains at time of discharge:   Lines/Drains/Airways       None                   Time spent on the discharge of patient: 44 minutes         Cecilia Giron NP  Department of Hospital Medicine  [facility] - Telemetry ([facility])

## 2023-10-12 NOTE — PLAN OF CARE
PT EVAL complete. Pt MOD I with bed mobility, ambulated 180ft CGA, no AD. Recommending Outpatient PT upon d/c.

## 2023-10-12 NOTE — ASSESSMENT & PLAN NOTE
-Repeat CT of head was negative  -lipid panel - LDL 69.6  -A1c 5.3  -Echo EF 55-70% normal systolic and diastolic function  -Carotid US no significant stenosis seen  -Neuro checks q4hrs  -PT/OT recommended home with OP PT/OT

## 2023-10-12 NOTE — CONSULTS
[facility]  Vascular Neurology  Comprehensive Stroke Center  TeleVascular Neurology Acute Consultation Note      Consult to Telemedicine - Acute Stroke  Consult performed by: Jay Jay Malhotra MD  Consult ordered by: Salvatore Lees MD          Consulting Provider: SALVATORE LEES  Current Providers  No providers found    Patient Location:  [facility] EMERGENCY DEPARTMENT Emergency Department  Spoke hospital nurse at bedside with patient assisting consultant.     Patient information was obtained from patient and past medical records.         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    NIHSS 2     CT head not done.    Not a candidate for thrombolytics as she is OOW.  Suspicion for an ischemic stroke is low.   Recommend CT head.  If normal, it is reasonable to obtain an MRI of the brain without contrast given that she is never had these symptoms before and she is a diabetic.  If MRI brain normal, consider a C-spine etiology.  Defer starting any antiplatelets for now until MRI confirms presence of acute ischemic stroke. Permissive HTN w/ SBP <220 until then.          STROKE DOCUMENTATION     Acute Stroke Times:   Acute Stroke Times   Unknown Normal Date: Unknown Date  Last Known Normal Time:  (Between 11pm last night and 3am today))  Unknown Normal Time: Unknown Time  Stroke Team Called Date: 10/11/23  Stroke Team Called Time: 2058  Stroke Team Arrival Date: 10/11/23  Stroke Team Arrival Time: 2100  CT not completed at time of evaluation - please review at spoke and document time: 2108  Thrombolytic Therapy Recommended: No    NIH Scale:  1a. Level of Consciousness: 0-->Alert, keenly responsive  1b. LOC Questions: 0-->Answers both questions correctly  1c. LOC Commands:  "0-->Performs both tasks correctly  2. Best Gaze: 0-->Normal  3. Visual: 0-->No visual loss  4. Facial Palsy: 0-->Normal symmetrical movements  5a. Motor Arm, Left: 0-->No drift, limb holds 90 (or 45) degrees for full 10 secs  5b. Motor Arm, Right: 1-->Drift, limb holds 90 (or 45) degrees, but drifts down before full 10 secs, does not hit bed or other support  6a. Motor Leg, Left: 0-->No drift, leg holds 30 degree position for full 5 secs  6b. Motor Leg, Right: 0-->No drift, leg holds 30 degree position for full 5 secs  7. Limb Ataxia: 0-->Absent  8. Sensory: 1-->Mild-to-moderate sensory loss, patient feels pinprick is less sharp or is dull on the affected side, or there is a loss of superficial pain with pinprick, but patient is aware of being touched  9. Best Language: 0-->No aphasia, normal  10. Dysarthria: 0-->Normal  11. Extinction and Inattention (formerly Neglect): 0-->No abnormality  Total (NIH Stroke Scale): 2     Modified Oliver    Trever Coma Scale:    ABCD2 Score:    [id number] Score:   HAS -BLED Score:   ICH Score:   Hunt & Centeno Classification:       Blood pressure 114/70, pulse 91, temperature 97.1 °F (36.2 °C), temperature source Oral, resp. rate 18, height 5' 5" (1.651 m), weight 102.7 kg (226 lb 8.4 oz), SpO2 97 %.  Eligible for thrombolytic therapy?: No  Thrombolytic therapy recomended: Thrombolytic therapy not recommended due to Outside of treatment window  and Mild Non-Disabling Symptoms  Possible Interventional Revascularization Candidate? No; at this time symptoms not suggestive of large vessel occlusion    Disposition Recommendation: pending further studies    Subjective:     History of Present Illness:  24 y/o female with a history of hydrocephalus 2/2 aqueductal stenosis s/p  shunt (8/8/2023), T2DM who presented with tingling and weakness in the right upper and lower extremity. These symptoms have never occurred before.  Symptoms began sometime between 11pm last night and 3am " "today.    /70      Woke up with symptoms?: no    Recent bleeding noted: no  Does the patient take any Blood Thinners? no  Medications: No relevant medications      Past Medical History: diabetes    Past Surgical History:  S/p  shunt on 8/8/2023    Family History: no relevant history    Social History: no smoking, no drinking, no drugs    Allergies: No Known Allergies No relevant allergies    Review of Systems   Neurological:  Positive for weakness and numbness. Negative for headaches.     Objective:   Vitals: Blood pressure 114/70, pulse 91, temperature 97.1 °F (36.2 °C), temperature source Oral, resp. rate 18, height 5' 5" (1.651 m), weight 102.7 kg (226 lb 8.4 oz), SpO2 97 %. BP: 114/70    CT READ: No    Physical Exam  Neurological:      Mental Status: She is oriented to person, place, and time.      Cranial Nerves: No cranial nerve deficit, dysarthria or facial asymmetry.      Sensory: Sensory deficit present.      Coordination: Finger-Nose-Finger Test normal.      Comments: Questionable drift in the RUE                 Recommended the emergency room physician to have a brief discussion with the patient and/or family if available regarding the  risks and benefits of treatment, and to briefly document the occurrence of that discussion in his clinical encounter note.     The attending portion of this evaluation, treatment, and documentation was performed per Jay Jay Malhotra MD via audiovisual.    Billing code:  (non-intervention mild to moderate stroke, TIA, some mimics)      This patient has a critical neurological condition/illness, with some potential for high morbidity and mortality.  There is a moderate probability for acute neurological change leading to clinical and possibly life-threatening deterioration requiring highest level of physician preparedness for urgent intervention.  Care was coordinated with other physicians involved in the patient's care.  Radiologic studies and " laboratory data were reviewed and interpreted, and plan of care was re-assessed based on the results.  Diagnosis, treatment options and prognosis may have been discussed with the patient and/or family members or caregiver.    In your opinion, this was a: Tier 2 Van Negative    Consult End Time: 9:17 PM     Jay Jay Malhotra MD  [facility]  Vascular Neurology   [facility]

## 2023-10-12 NOTE — PT/OT/SLP EVAL
Occupational Therapy   Evaluation and Discharge Note    Name: Earl Mcelroy  MRN: [id number]  Admitting Diagnosis: Right sided numbness  Recent Surgery: * No surgery found *      Recommendations:     Discharge Recommendations: outpatient OT  Discharge Equipment Recommendations: none  Barriers to discharge:  None    Assessment:     Earl Mcelroy is a 23 y.o. female with a medical diagnosis of Right sided numbness. At this time, patient is functioning at a high level of function and does not require further acute OT services. Defer to PT for gait and t/f training.     Plan:   D/C OT services at this time.   During this hospitalization, patient does not require further acute OT services.  Please re-consult if situation changes.    Plan of Care Reviewed with: patient    Subjective     Chief Complaint: tingling in extremities  Patient/Family Comments/goals: get better, return home    Occupational Profile:  Living Environment: lives with boyfriend and his mom in a 1 story house with no steps to enter.   Previous level of function: Pt (I) with ADLs and functional mobility.  Roles and Routines: drives occasionally, works, and goes to school  Equipment Used at home: none  Assistance upon Discharge: family    Pain/Comfort:  Pain Rating 1: 0/10  Objective:     Communicated with: Nurse and epic chart review prior to session.  Patient found supine with peripheral IV, telemetry upon OT entry to room.    General Precautions: Standard, fall  Orthopedic Precautions: N/A  Braces: N/A  Respiratory Status: Room air     Occupational Performance:    Bed Mobility:    Patient completed Rolling/Turning to Right with modified independence  Patient completed Scooting/Bridging with modified independence  Patient completed Supine to Sit with modified independence    Functional Mobility/Transfers:  Patient completed Sit <> Stand Transfer with stand by assistance  with  no assistive device   Patient completed Bed <> Chair Transfer using  "Stand Pivot technique with contact guard assistance with no assistive device  Functional Mobility: Patient completed x180ft functional mobility CGA and no AD to increase dynamic standing balance and activity tolerance needed for ADL completion.   Pt reporting mild dizziness upon sitting EOB.    Activities of Daily Living:  Lower Body Dressing: independence angelica socks EOB    Cognitive/Visual Perceptual:  Cognitive/Psychosocial Skills:     -       Oriented to: Person, Place, Time, and Situation   -       Follows Commands/attention:Follows multistep  commands  -       Communication: clear/fluent  -       Memory: No Deficits noted  -       Safety awareness/insight to disability: intact   Visual/Perceptual:      -Intact pt reports no changes in vision.    Physical Exam:  Sensation:    -       Impaired  pt reports tingling in BLE, LLE from knee to foot only, and RUE. Decreased touch sensation in RLE and RUE. "It feels different."  Dominant hand:    -       left  Upper Extremity Range of Motion:     -       Right Upper Extremity: WNL  -       Left Upper Extremity: WNL  Upper Extremity Strength:    -       Right Upper Extremity: 4-/5 grossly  -       Left Upper Extremity: 4+/5 grossly   Strength:    -       Right Upper Extremity: fair/good  -       Left Upper Extremity: WNL    AMPAC 6 Click ADL:  AMPAC Total Score: 24    Treatment & Education:  Pt reports occasional R hand edema, none noted at this time. Educated pt on pursed lip breathing technique and importance of activity pacing for dizziness. Patient educated on role of OT in acute setting and benefits of participation. Educated on techniques to use to increase independence and decrease fall risk with functional transfers. Educated on importance of OOB activity and calling for A to transfer back to bed. Encouraged completion of B UE AROM therex throughout the day to tolerance to increase functional strength and activity tolerance. Educated patient on importance of " increased tolerance to upright position and direct impact on CV endurance and strength. Patient encouraged to sit up in chair for a minimum of 2 consecutive hours per day. Patient stated understanding and in agreement with POC.     Patient left up in chair with all lines intact, call button in reach, and chair alarm on    GOALS:   Multidisciplinary Problems       Occupational Therapy Goals       Not on file                    History:     Past Medical History:   Diagnosis Date    Aqueductal stenosis          Past Surgical History:   Procedure Laterality Date    ENDOSCOPIC INSERTION OF VENTRICULOPERITONEAL SHUNT N/A 8/8/2023    Procedure: INSERTION, SHUNT, VENTRICULOPERITONEAL, ENDOSCOPIC;  Surgeon: Khoa Johnson MD;  Location: [address];  Service: Neurosurgery;  Laterality: N/A;    VENTRICULOPERITONEAL SHUNT Right 8/8/2023    Procedure: INSERTION, SHUNT, VENTRICULOPERITONEAL;  Surgeon: Khoa Johnson MD;  Location: [facility] OR [address] [place] [address];  Service: Neurosurgery;  Laterality: Right;  R side ventriculoperitoneal shunt placement       Time Tracking:     OT Date of Treatment: 10/12/23  OT Start Time: 1055  OT Stop Time: 1120  OT Total Time (min): 25 min    Billable Minutes:Evaluation 15  Therapeutic Activity 10    10/12/2023  Juju Randle OT

## 2023-10-12 NOTE — H&P
"[facility]  [facility] Medicine  History & Physical    Patient Name: Earl Mcelroy  MRN: [id number]  Patient Class: OP- Observation  Admission Date: 10/11/2023  Attending Physician: Daniel Pinedo MD   Primary Care Provider: Jalyn Castillo FNP         Patient information was obtained from patient, past medical records and ER records.     Subjective:     Principal Problem:Right sided numbness    Chief Complaint:   Chief Complaint   Patient presents with    Neck Pain     Pt had  shunt surgery  8/08/23 pt started having R sided neck pain and extremity numbness and tingling last night. Pt had cp yesterday after leaving work but that has subsided        HPI: Earl Mcelroy is a 23 y.o. female with a PMH  has a past medical history of Aqueductal stenosis.  Presented to the ER for evaluation right-sided neck pain as well as right upper and right lower extremity numbness/tingling.  Patient reports his symptoms started shortly after getting work between 2300 and 0300 on (10/11/2023).  Denies any injury or trauma which may have caused the onset of her symptoms.  However, patient does state that she had  shunt placed on 08/08/2023, which runs down right side of neck, at [facility] by Dr. Khoa Johnson for aqueductal stenosis.  Patient reports neck pain has resolved but still has numbness/tingling to her right upper and lower extremity.  Vascular Neurology  (Dr. Slater) was consulted by ED and recommended CT of the head and MRI if initial imaging is negative.  However, patient has a programmable shunt and it is unknown if patient can have MRI performed at our facility.  Neurosurgery on-call provider in [place] was consulted by ER provider. Per ER provider's documentation: "Discussed pt's case with Dr. Benavides (Neurosurgery) who states that the pt does not need to be transferred to [facility] for shunt evaluation tonight".  Patient denies any other associated stroke-like symptoms such as dysarthria, " "dysphagia, facial droop or extremity weakness/drift.  Denies any other symptoms at this time.    Blood work ER was unremarkable.  Shunt series, CT head, and CT of C-spine was negative for acute findings.  Hospital Medicine consulted to admit patient for further stroke workup and repeat CT of the head per vascular Neurology/Neurosurgery recommendations.  Patient in agreement with treatment plan.  Patient will be admitted under observation status.    Per surgical note: " A Codman Hakim valve was obtained and set at 100".    PCP: Jalyn Castillo      Past Medical History:   Diagnosis Date    Aqueductal stenosis        Past Surgical History:   Procedure Laterality Date    ENDOSCOPIC INSERTION OF VENTRICULOPERITONEAL SHUNT N/A 8/8/2023    Procedure: INSERTION, SHUNT, VENTRICULOPERITONEAL, ENDOSCOPIC;  Surgeon: Khoa Johnson MD;  Location: [facility] OR [address] [place] [address];  Service: Neurosurgery;  Laterality: N/A;    VENTRICULOPERITONEAL SHUNT Right 8/8/2023    Procedure: INSERTION, SHUNT, VENTRICULOPERITONEAL;  Surgeon: Khoa Johnson MD;  Location: [facility] OR [address] [place] [address];  Service: Neurosurgery;  Laterality: Right;  R side ventriculoperitoneal shunt placement       Review of patient's allergies indicates:  No Known Allergies    No current facility-administered medications on file prior to encounter.     Current Outpatient Medications on File Prior to Encounter   Medication Sig    metFORMIN (GLUCOPHAGE) 500 MG tablet Take 500 mg by mouth once.    [DISCONTINUED] HYDROcodone-acetaminophen (NORCO) 5-325 mg per tablet Take 1 tablet by mouth every 6 (six) hours as needed for Pain.    [DISCONTINUED] ondansetron (ZOFRAN-ODT) 4 MG TbDL Take 1 tablet (4 mg total) by mouth every 8 (eight) hours as needed (vomiting).    [DISCONTINUED] ondansetron (ZOFRAN-ODT) 4 MG TbDL Dissolve 2 tablets (8 mg total) by mouth every 8 (eight) hours as needed (nausea/vomiting).     Family History       Problem Relation (Age of Onset)    Diabetes Paternal Uncle "    Hypertension Mother, Father          Tobacco Use    Smoking status: Never    Smokeless tobacco: Never   Substance and Sexual Activity    Alcohol use: Never    Drug use: Never    Sexual activity: Not on file     Review of Systems   Eyes:  Negative for photophobia and visual disturbance.   Musculoskeletal:  Positive for neck pain. Negative for neck stiffness.   Neurological:  Positive for weakness and numbness (Tingling of RUE/RLE). Negative for dizziness, facial asymmetry, speech difficulty, light-headedness and headaches.   All other systems reviewed and are negative.    Objective:     Vital Signs (Most Recent):  Temp: 97.1 °F (36.2 °C) (10/11/23 1908)  Pulse: 74 (10/12/23 0430)  Resp: 12 (10/11/23 2130)  BP: (!) 144/70 (10/12/23 0430)  SpO2: 98 % (10/12/23 0430) Vital Signs (24h Range):  Temp:  [97.1 °F (36.2 °C)] 97.1 °F (36.2 °C)  Pulse:  [72-95] 74  Resp:  [12-19] 12  SpO2:  [97 %-98 %] 98 %  BP: (114-144)/(62-81) 144/70     Weight: 102.7 kg (226 lb 8.4 oz)  Body mass index is 37.7 kg/m².     Physical Exam  Vitals and nursing note reviewed.   Constitutional:       General: She is awake. She is not in acute distress.     Appearance: Normal appearance. She is well-developed and well-groomed. She is not ill-appearing, toxic-appearing or diaphoretic.   HENT:      Head: Normocephalic and atraumatic.      Mouth/Throat:      Tongue: Tongue does not deviate from midline.   Eyes:      Extraocular Movements: Extraocular movements intact.      Conjunctiva/sclera: Conjunctivae normal.      Pupils: Pupils are equal, round, and reactive to light.      Visual Fields: Right eye visual fields normal and left eye visual fields normal.   Neck:        Comments: Point in his just lateral to the cervical spine neither C5-C6-C7 region.  No obvious deformities, crepitus, step-offs, or midline bony tenderness noted.  Positive Spurling's test on right side.  Cardiovascular:      Rate and Rhythm: Normal rate and regular rhythm.       Pulses:           Radial pulses are 2+ on the right side and 2+ on the left side.        Dorsalis pedis pulses are 2+ on the right side and 2+ on the left side.      Heart sounds: Normal heart sounds. No murmur heard.  Pulmonary:      Effort: Pulmonary effort is normal.      Breath sounds: Normal breath sounds.   Abdominal:      General: Bowel sounds are normal.      Palpations: Abdomen is soft.      Tenderness: There is no abdominal tenderness.   Musculoskeletal:      Cervical back: Normal range of motion and neck supple. Muscular tenderness present. No spinous process tenderness.      Right lower leg: No edema.      Left lower leg: No edema.      Comments: 5/5 strength throughout, with the exception to 4/5 strength noted to right upper extremity and right lower extremity.   Skin:     General: Skin is warm and dry.      Capillary Refill: Capillary refill takes less than 2 seconds.   Neurological:      General: No focal deficit present.      Mental Status: She is alert and oriented to person, place, and time. Mental status is at baseline.      GCS: GCS eye subscore is 4. GCS verbal subscore is 5. GCS motor subscore is 6.      Cranial Nerves: Cranial nerves 2-12 are intact.      Sensory: Sensory deficit (Subtle decreased sensation in right upper extremity and right lower extremity.) present.      Motor: Motor function is intact.      Coordination: Coordination is intact.      Deep Tendon Reflexes: Reflexes are normal and symmetric.   Psychiatric:         Mood and Affect: Mood normal.         Speech: Speech normal.         Behavior: Behavior normal. Behavior is cooperative.              CRANIAL NERVES     CN III, IV, VI   Pupils are equal, round, and reactive to light.     LABS:  Recent Results (from the past 24 hour(s))   CBC W/ AUTO DIFFERENTIAL    Collection Time: 10/11/23  8:44 PM   Result Value Ref Range    WBC 10.45 3.90 - 12.70 K/uL    RBC 4.08 4.00 - 5.40 M/uL    Hemoglobin 11.0 (L) 12.0 - 16.0 g/dL     Hematocrit 34.8 (L) 37.0 - 48.5 %    MCV 85 82 - 98 fL    MCH 27.0 27.0 - 31.0 pg    MCHC 31.6 (L) 32.0 - 36.0 g/dL    RDW 14.3 11.5 - 14.5 %    Platelets 301 150 - 450 K/uL    MPV 10.5 9.2 - 12.9 fL    Immature Granulocytes 0.3 0.0 - 0.5 %    Gran # (ANC) 6.7 1.8 - 7.7 K/uL    Immature Grans (Abs) 0.03 0.00 - 0.04 K/uL    Lymph # 2.7 1.0 - 4.8 K/uL    Mono # 0.9 0.3 - 1.0 K/uL    Eos # 0.1 0.0 - 0.5 K/uL    Baso # 0.03 0.00 - 0.20 K/uL    nRBC 0 0 /100 WBC    Gran % 64.3 38.0 - 73.0 %    Lymph % 25.5 18.0 - 48.0 %    Mono % 8.3 4.0 - 15.0 %    Eosinophil % 1.3 0.0 - 8.0 %    Basophil % 0.3 0.0 - 1.9 %    Differential Method Automated    Comprehensive metabolic panel    Collection Time: 10/11/23  8:44 PM   Result Value Ref Range    Sodium 142 136 - 145 mmol/L    Potassium 3.8 3.5 - 5.1 mmol/L    Chloride 109 95 - 110 mmol/L    CO2 23 23 - 29 mmol/L    Glucose 95 70 - 110 mg/dL    BUN 12 6 - 20 mg/dL    Creatinine 1.0 0.5 - 1.4 mg/dL    Calcium 8.8 8.7 - 10.5 mg/dL    Total Protein 7.3 6.0 - 8.4 g/dL    Albumin 3.7 3.5 - 5.2 g/dL    Total Bilirubin 0.4 0.1 - 1.0 mg/dL    Alkaline Phosphatase 94 55 - 135 U/L    AST 12 10 - 40 U/L    ALT 9 (L) 10 - 44 U/L    eGFR >60 >60 mL/min/1.73 m^2    Anion Gap 10 8 - 16 mmol/L   Protime-INR    Collection Time: 10/11/23  8:44 PM   Result Value Ref Range    Prothrombin Time 10.7 9.0 - 12.5 sec    INR 1.0 0.8 - 1.2   TSH    Collection Time: 10/11/23  8:44 PM   Result Value Ref Range    TSH 0.845 0.400 - 4.000 uIU/mL   LDL - Lipid Panel    Collection Time: 10/11/23  8:44 PM   Result Value Ref Range    Cholesterol 130 120 - 199 mg/dL    Triglycerides 82 30 - 150 mg/dL    HDL 44 40 - 75 mg/dL    LDL Cholesterol 69.6 63.0 - 159.0 mg/dL    HDL/Cholesterol Ratio 33.8 20.0 - 50.0 %    Total Cholesterol/HDL Ratio 3.0 2.0 - 5.0    Non-HDL Cholesterol 86 mg/dL   Troponin I    Collection Time: 10/12/23  4:04 AM   Result Value Ref Range    Troponin I <0.006 0.000 - 0.026 ng/mL        RADIOLOGY  CT Head Without Contrast    Addendum Date: 10/11/2023    Mild prominence of lateral ventricles stable compared to prior exam of 9/25/23. Posterior approach right ventricular ostomy with tip in the left lateral ventricle. Third ventricle is stable measuring 6 mm. Electronically signed by: Fabian Joseph Date:    10/11/2023 Time:    22:27    Result Date: 10/11/2023  EXAMINATION: CT HEAD WITHOUT CONTRAST CLINICAL HISTORY: Stroke/TIA, determine embolic source; TECHNIQUE: Low dose axial CT images obtained throughout the head without intravenous contrast. Sagittal and coronal reconstructions were performed. COMPARISON: None. FINDINGS: Mild lateral ventricles.  Posterior approach right ventricular ostomy with tip in the left lateral ventricle.  Third ventricle is stable measuring 6 mm. No extra-axial blood or fluid collections. No parenchymal mass, hemorrhage, edema or major vascular distribution infarct. Skull/extracranial contents (limited evaluation): No fracture. Mastoid air cells and paranasal sinuses are essentially clear.     Mild lateral ventricles.  Posterior approach right ventricular ostomy with tip in the left lateral ventricle.  Third ventricle is stable measuring 6 mm. No hemorrhage mass effect or midline shift. All CT scans   are performed using dose optimization techniques including the following: automated exposure control; adjustment of the mA and/or kV; use of iterative reconstruction technique.  Dose modulation was employed for ALARA by means of: Automated exposure control; adjustment of the mA and/or kV according to patient size (this includes techniques or standardized protocols for targeted exams where dose is matched to indication/reason for exam; i.e. extremities or head); and/or use of iterative reconstructive technique. Electronically signed by: Fabian Joseph Date:    10/11/2023 Time:    22:14    X-Ray Shunt Series    Result Date: 10/11/2023  EXAMINATION: XR SHUNT SERIES CLINICAL  HISTORY: XR SHUNT SERIES COMPARISON: None FINDINGS: Multiple radiographic views  were obtained. No evidence of acute fracture or dislocation.  Bony mineralization is normal.  Soft tissues are unremarkable. The right-sided ventricular peritoneal catheter appears unremarkable and extends to the left hemipelvis at the distal tip.     No acute abnormality. No discontinuity   of the  shunt as far as seen Electronically signed by: Fabian Joseph Date:    10/11/2023 Time:    22:22    CT Cervical Spine Without Contrast    Result Date: 10/11/2023  EXAMINATION: CT CERVICAL SPINE WITHOUT CONTRAST CLINICAL HISTORY: Neck pain, acute, prior cervical surgery; TECHNIQUE: Low dose axial images, sagittal and coronal reformations were performed though the cervical spine.  Contrast was not administered. COMPARISON: None FINDINGS: Normal vertebral body heights without evidence for spondylolisthesis.  Joint spaces are preserved.  No prevertebral soft tissue swelling.  Facet joints are congruent.  Normal bone mineral density.  A right-sided shunt catheter     No acute fracture or dislocation. Visualized right-sided  catheter is intact All CT scans   are performed using dose optimization techniques including the following: automated exposure control; adjustment of the mA and/or kV; use of iterative reconstruction technique.  Dose modulation was employed for ALARA by means of: Automated exposure control; adjustment of the mA and/or kV according to patient size (this includes techniques or standardized protocols for targeted exams where dose is matched to indication/reason for exam; i.e. extremities or head); and/or use of iterative reconstructive technique. Electronically signed by: Fabian Joseph Date:    10/11/2023 Time:    22:16    CT Head Without Contrast    Result Date: 9/25/2023  EXAMINATION: CT HEAD WITHOUT CONTRAST CLINICAL HISTORY: Malformations of aqueduct of Sylviushydrocephalus. TECHNIQUE: Standard noncontrast CT of the brain.  All CT scans at this facility are performed  using dose modulation techniques as appropriate to performed exam including the following:  automated exposure control; adjustment of mA and/or kV according to the patients size (this includes techniques or standardized protocols for targeted exams where dose is matched to indication/reason for exam: i.e. extremities or head);  iterative reconstruction technique. COMPARISON: 08/22/2023 CT scan and 08/05/2023 CT scan FINDINGS: Brain: A right parietal ventriculostomy shunt catheter is present.  The catheter tip terminates in the left frontal horn There is persistent ventriculomegaly, similar to the postop study of 08/22/2023.  No interval changes noted.  Transverse diameter of the 3rd ventricle is 6.8 mm.  The previous dimension 8.7 mm. Skull: No acute abnormality.     No acute findings.  Stable exam. Electronically signed by: Daren Alvarez MD Date:    09/25/2023 Time:    10:29      EKG    MICROBIOLOGY    MDM     Amount and/or Complexity of Data Reviewed  Clinical lab tests: reviewed  Tests in the radiology section of CPT®: reviewed  Tests in the medicine section of CPT®: reviewed  Discussion of test results with the performing providers: yes  Decide to obtain previous medical records or to obtain history from someone other than the patient: yes  Obtain history from someone other than the patient: yes  Review and summarize past medical records: yes  Discuss the patient with other providers: yes  Independent visualization of images, tracings, or specimens: yes          Assessment/Plan:     * Right sided numbness  Plan:  -Repeat CT of head  -MRI of brain if device is compatible   -lipid panel  -A1c  -ASA  -Echo   -Carotid US  -Neuro checks q4hrs  -PT/OT  Neurology consult if warranted      Right cervical radiculopathy  S/P  shunt placement on 8/8/23 down right side. + Spurling's test with associated right-sided weakness.  Plan:  -MRI cervical spine if device is  compatible      Aqueductal stenosis   Shunt placed by Dr. Khoa Johnson at [facility] on 8/8/23.   Plan:  -OP f/u as scheduled      VTE Risk Mitigation (From admission, onward)         Ordered     enoxaparin injection 40 mg  Daily         10/12/23 0209     IP VTE HIGH RISK PATIENT  Once         10/12/23 0209     Place sequential compression device  Until discontinued         10/12/23 0209              //Core Measures   -DVT proph: SCDs, lovenox   -Code status Full    -Surrogate:Sig other      Components of this note were documented using a voice recognition system and are subject to errors not corrected at the time the document was proof read. Please contact the author for any clarifications.       Eusebio Larkin NP  Department of Hospital Medicine  [facility] - Emergency Dept.

## 2023-10-12 NOTE — SUBJECTIVE & OBJECTIVE
Past Medical History:   Diagnosis Date    Aqueductal stenosis        Past Surgical History:   Procedure Laterality Date    ENDOSCOPIC INSERTION OF VENTRICULOPERITONEAL SHUNT N/A 8/8/2023    Procedure: INSERTION, SHUNT, VENTRICULOPERITONEAL, ENDOSCOPIC;  Surgeon: Khoa Johnson MD;  Location: [address];  Service: Neurosurgery;  Laterality: N/A;    VENTRICULOPERITONEAL SHUNT Right 8/8/2023    Procedure: INSERTION, SHUNT, VENTRICULOPERITONEAL;  Surgeon: Khoa Johnson MD;  Location: [address];  Service: Neurosurgery;  Laterality: Right;  R side ventriculoperitoneal shunt placement       Review of patient's allergies indicates:  No Known Allergies    No current facility-administered medications on file prior to encounter.     Current Outpatient Medications on File Prior to Encounter   Medication Sig    metFORMIN (GLUCOPHAGE) 500 MG tablet Take 500 mg by mouth once.    [DISCONTINUED] HYDROcodone-acetaminophen (NORCO) 5-325 mg per tablet Take 1 tablet by mouth every 6 (six) hours as needed for Pain.    [DISCONTINUED] ondansetron (ZOFRAN-ODT) 4 MG TbDL Take 1 tablet (4 mg total) by mouth every 8 (eight) hours as needed (vomiting).    [DISCONTINUED] ondansetron (ZOFRAN-ODT) 4 MG TbDL Dissolve 2 tablets (8 mg total) by mouth every 8 (eight) hours as needed (nausea/vomiting).     Family History       Problem Relation (Age of Onset)    Diabetes Paternal Uncle    Hypertension Mother, Father          Tobacco Use    Smoking status: Never    Smokeless tobacco: Never   Substance and Sexual Activity    Alcohol use: Never    Drug use: Never    Sexual activity: Not on file     Review of Systems   Eyes:  Negative for photophobia and visual disturbance.   Musculoskeletal:  Positive for neck pain. Negative for neck stiffness.   Neurological:  Positive for weakness and numbness (Tingling of RUE/RLE). Negative for dizziness, facial asymmetry, speech difficulty, light-headedness and headaches.   All other systems reviewed  and are negative.    Objective:     Vital Signs (Most Recent):  Temp: 97.1 °F (36.2 °C) (10/11/23 1908)  Pulse: 74 (10/12/23 0430)  Resp: 12 (10/11/23 2130)  BP: (!) 144/70 (10/12/23 0430)  SpO2: 98 % (10/12/23 0430) Vital Signs (24h Range):  Temp:  [97.1 °F (36.2 °C)] 97.1 °F (36.2 °C)  Pulse:  [72-95] 74  Resp:  [12-19] 12  SpO2:  [97 %-98 %] 98 %  BP: (114-144)/(62-81) 144/70     Weight: 102.7 kg (226 lb 8.4 oz)  Body mass index is 37.7 kg/m².     Physical Exam  Vitals and nursing note reviewed.   Constitutional:       General: She is awake. She is not in acute distress.     Appearance: Normal appearance. She is well-developed and well-groomed. She is not ill-appearing, toxic-appearing or diaphoretic.   HENT:      Head: Normocephalic and atraumatic.      Mouth/Throat:      Tongue: Tongue does not deviate from midline.   Eyes:      Extraocular Movements: Extraocular movements intact.      Conjunctiva/sclera: Conjunctivae normal.      Pupils: Pupils are equal, round, and reactive to light.      Visual Fields: Right eye visual fields normal and left eye visual fields normal.   Neck:        Comments: Point in his just lateral to the cervical spine neither C5-C6-C7 region.  No obvious deformities, crepitus, step-offs, or midline bony tenderness noted.  Positive Spurling's test on right side.  Cardiovascular:      Rate and Rhythm: Normal rate and regular rhythm.      Pulses:           Radial pulses are 2+ on the right side and 2+ on the left side.        Dorsalis pedis pulses are 2+ on the right side and 2+ on the left side.      Heart sounds: Normal heart sounds. No murmur heard.  Pulmonary:      Effort: Pulmonary effort is normal.      Breath sounds: Normal breath sounds.   Abdominal:      General: Bowel sounds are normal.      Palpations: Abdomen is soft.      Tenderness: There is no abdominal tenderness.   Musculoskeletal:      Cervical back: Normal range of motion and neck supple. Muscular tenderness present. No  spinous process tenderness.      Right lower leg: No edema.      Left lower leg: No edema.      Comments: 5/5 strength throughout, with the exception to 4/5 strength noted to right upper extremity and right lower extremity.   Skin:     General: Skin is warm and dry.      Capillary Refill: Capillary refill takes less than 2 seconds.   Neurological:      General: No focal deficit present.      Mental Status: She is alert and oriented to person, place, and time. Mental status is at baseline.      GCS: GCS eye subscore is 4. GCS verbal subscore is 5. GCS motor subscore is 6.      Cranial Nerves: Cranial nerves 2-12 are intact.      Sensory: Sensory deficit (Subtle decreased sensation in right upper extremity and right lower extremity.) present.      Motor: Motor function is intact.      Coordination: Coordination is intact.      Deep Tendon Reflexes: Reflexes are normal and symmetric.   Psychiatric:         Mood and Affect: Mood normal.         Speech: Speech normal.         Behavior: Behavior normal. Behavior is cooperative.              CRANIAL NERVES     CN III, IV, VI   Pupils are equal, round, and reactive to light.     LABS:  Recent Results (from the past 24 hour(s))   CBC W/ AUTO DIFFERENTIAL    Collection Time: 10/11/23  8:44 PM   Result Value Ref Range    WBC 10.45 3.90 - 12.70 K/uL    RBC 4.08 4.00 - 5.40 M/uL    Hemoglobin 11.0 (L) 12.0 - 16.0 g/dL    Hematocrit 34.8 (L) 37.0 - 48.5 %    MCV 85 82 - 98 fL    MCH 27.0 27.0 - 31.0 pg    MCHC 31.6 (L) 32.0 - 36.0 g/dL    RDW 14.3 11.5 - 14.5 %    Platelets 301 150 - 450 K/uL    MPV 10.5 9.2 - 12.9 fL    Immature Granulocytes 0.3 0.0 - 0.5 %    Gran # (ANC) 6.7 1.8 - 7.7 K/uL    Immature Grans (Abs) 0.03 0.00 - 0.04 K/uL    Lymph # 2.7 1.0 - 4.8 K/uL    Mono # 0.9 0.3 - 1.0 K/uL    Eos # 0.1 0.0 - 0.5 K/uL    Baso # 0.03 0.00 - 0.20 K/uL    nRBC 0 0 /100 WBC    Gran % 64.3 38.0 - 73.0 %    Lymph % 25.5 18.0 - 48.0 %    Mono % 8.3 4.0 - 15.0 %    Eosinophil % 1.3  0.0 - 8.0 %    Basophil % 0.3 0.0 - 1.9 %    Differential Method Automated    Comprehensive metabolic panel    Collection Time: 10/11/23  8:44 PM   Result Value Ref Range    Sodium 142 136 - 145 mmol/L    Potassium 3.8 3.5 - 5.1 mmol/L    Chloride 109 95 - 110 mmol/L    CO2 23 23 - 29 mmol/L    Glucose 95 70 - 110 mg/dL    BUN 12 6 - 20 mg/dL    Creatinine 1.0 0.5 - 1.4 mg/dL    Calcium 8.8 8.7 - 10.5 mg/dL    Total Protein 7.3 6.0 - 8.4 g/dL    Albumin 3.7 3.5 - 5.2 g/dL    Total Bilirubin 0.4 0.1 - 1.0 mg/dL    Alkaline Phosphatase 94 55 - 135 U/L    AST 12 10 - 40 U/L    ALT 9 (L) 10 - 44 U/L    eGFR >60 >60 mL/min/1.73 m^2    Anion Gap 10 8 - 16 mmol/L   Protime-INR    Collection Time: 10/11/23  8:44 PM   Result Value Ref Range    Prothrombin Time 10.7 9.0 - 12.5 sec    INR 1.0 0.8 - 1.2   TSH    Collection Time: 10/11/23  8:44 PM   Result Value Ref Range    TSH 0.845 0.400 - 4.000 uIU/mL   LDL - Lipid Panel    Collection Time: 10/11/23  8:44 PM   Result Value Ref Range    Cholesterol 130 120 - 199 mg/dL    Triglycerides 82 30 - 150 mg/dL    HDL 44 40 - 75 mg/dL    LDL Cholesterol 69.6 63.0 - 159.0 mg/dL    HDL/Cholesterol Ratio 33.8 20.0 - 50.0 %    Total Cholesterol/HDL Ratio 3.0 2.0 - 5.0    Non-HDL Cholesterol 86 mg/dL   Troponin I    Collection Time: 10/12/23  4:04 AM   Result Value Ref Range    Troponin I <0.006 0.000 - 0.026 ng/mL       RADIOLOGY  CT Head Without Contrast    Addendum Date: 10/11/2023    Mild prominence of lateral ventricles stable compared to prior exam of 9/25/23. Posterior approach right ventricular ostomy with tip in the left lateral ventricle. Third ventricle is stable measuring 6 mm. Electronically signed by: Fabian Joseph Date:    10/11/2023 Time:    22:27    Result Date: 10/11/2023  EXAMINATION: CT HEAD WITHOUT CONTRAST CLINICAL HISTORY: Stroke/TIA, determine embolic source; TECHNIQUE: Low dose axial CT images obtained throughout the head without intravenous contrast. Sagittal and  coronal reconstructions were performed. COMPARISON: None. FINDINGS: Mild lateral ventricles.  Posterior approach right ventricular ostomy with tip in the left lateral ventricle.  Third ventricle is stable measuring 6 mm. No extra-axial blood or fluid collections. No parenchymal mass, hemorrhage, edema or major vascular distribution infarct. Skull/extracranial contents (limited evaluation): No fracture. Mastoid air cells and paranasal sinuses are essentially clear.     Mild lateral ventricles.  Posterior approach right ventricular ostomy with tip in the left lateral ventricle.  Third ventricle is stable measuring 6 mm. No hemorrhage mass effect or midline shift. All CT scans   are performed using dose optimization techniques including the following: automated exposure control; adjustment of the mA and/or kV; use of iterative reconstruction technique.  Dose modulation was employed for ALARA by means of: Automated exposure control; adjustment of the mA and/or kV according to patient size (this includes techniques or standardized protocols for targeted exams where dose is matched to indication/reason for exam; i.e. extremities or head); and/or use of iterative reconstructive technique. Electronically signed by: Fabian Joseph Date:    10/11/2023 Time:    22:14    X-Ray Shunt Series    Result Date: 10/11/2023  EXAMINATION: XR SHUNT SERIES CLINICAL HISTORY: XR SHUNT SERIES COMPARISON: None FINDINGS: Multiple radiographic views  were obtained. No evidence of acute fracture or dislocation.  Bony mineralization is normal.  Soft tissues are unremarkable. The right-sided ventricular peritoneal catheter appears unremarkable and extends to the left hemipelvis at the distal tip.     No acute abnormality. No discontinuity   of the  shunt as far as seen Electronically signed by: Fabian Joseph Date:    10/11/2023 Time:    22:22    CT Cervical Spine Without Contrast    Result Date: 10/11/2023  EXAMINATION: CT CERVICAL SPINE WITHOUT  CONTRAST CLINICAL HISTORY: Neck pain, acute, prior cervical surgery; TECHNIQUE: Low dose axial images, sagittal and coronal reformations were performed though the cervical spine.  Contrast was not administered. COMPARISON: None FINDINGS: Normal vertebral body heights without evidence for spondylolisthesis.  Joint spaces are preserved.  No prevertebral soft tissue swelling.  Facet joints are congruent.  Normal bone mineral density.  A right-sided shunt catheter     No acute fracture or dislocation. Visualized right-sided  catheter is intact All CT scans   are performed using dose optimization techniques including the following: automated exposure control; adjustment of the mA and/or kV; use of iterative reconstruction technique.  Dose modulation was employed for ALARA by means of: Automated exposure control; adjustment of the mA and/or kV according to patient size (this includes techniques or standardized protocols for targeted exams where dose is matched to indication/reason for exam; i.e. extremities or head); and/or use of iterative reconstructive technique. Electronically signed by: Fabian Joseph Date:    10/11/2023 Time:    22:16    CT Head Without Contrast    Result Date: 9/25/2023  EXAMINATION: CT HEAD WITHOUT CONTRAST CLINICAL HISTORY: Malformations of aqueduct of Sylviushydrocephalus. TECHNIQUE: Standard noncontrast CT of the brain. All CT scans at this facility are performed  using dose modulation techniques as appropriate to performed exam including the following:  automated exposure control; adjustment of mA and/or kV according to the patients size (this includes techniques or standardized protocols for targeted exams where dose is matched to indication/reason for exam: i.e. extremities or head);  iterative reconstruction technique. COMPARISON: 08/22/2023 CT scan and 08/05/2023 CT scan FINDINGS: Brain: A right parietal ventriculostomy shunt catheter is present.  The catheter tip terminates in the left  frontal horn There is persistent ventriculomegaly, similar to the postop study of 08/22/2023.  No interval changes noted.  Transverse diameter of the 3rd ventricle is 6.8 mm.  The previous dimension 8.7 mm. Skull: No acute abnormality.     No acute findings.  Stable exam. Electronically signed by: Daren Alvarez MD Date:    09/25/2023 Time:    10:29      EKG    MICROBIOLOGY    MDM     Amount and/or Complexity of Data Reviewed  Clinical lab tests: reviewed  Tests in the radiology section of CPT®: reviewed  Tests in the medicine section of CPT®: reviewed  Discussion of test results with the performing providers: yes  Decide to obtain previous medical records or to obtain history from someone other than the patient: yes  Obtain history from someone other than the patient: yes  Review and summarize past medical records: yes  Discuss the patient with other providers: yes  Independent visualization of images, tracings, or specimens: yes

## 2023-10-12 NOTE — ASSESSMENT & PLAN NOTE
- Shunt placed by Dr. Khoa Johnson at [facility] on 8/8/23  -XR shunt series shows  shunt intact  -f/u OP for evaluation

## 2023-10-12 NOTE — ED NOTES
During Telemedicine interview with pt. Dr. Malhotra asked if Pt. Received her CT scan, I replied no we are waiting for her UPT so we can do the scan. Dr. Malhotra then states pt could get her head CT with out the UPT. RN, CN notified as well as Dr. Lees.

## 2023-10-12 NOTE — PT/OT/SLP EVAL
Speech Language Pathology Evaluation  Cognitive/Bedside Swallow    Patient Name:  Earl Mcelroy   MRN:  [id number]  Admitting Diagnosis: Right sided numbness    Recommendations:                  General Recommendations:   diet follow up  Diet recommendations:  Regular Diet - IDDSI Level 7, Thin liquids - IDDSI Level 0   Aspiration Precautions:  behavioral reflux precautions and Standard aspiration precautions   General Precautions: Standard, aspiration  Communication strategies:  none    History:     Past Medical History:   Diagnosis Date    Aqueductal stenosis        Past Surgical History:   Procedure Laterality Date    ENDOSCOPIC INSERTION OF VENTRICULOPERITONEAL SHUNT N/A 8/8/2023    Procedure: INSERTION, SHUNT, VENTRICULOPERITONEAL, ENDOSCOPIC;  Surgeon: Khoa Johnson MD;  Location: [address];  Service: Neurosurgery;  Laterality: N/A;    VENTRICULOPERITONEAL SHUNT Right 8/8/2023    Procedure: INSERTION, SHUNT, VENTRICULOPERITONEAL;  Surgeon: Khoa Johnson MD;  Location: [address];  Service: Neurosurgery;  Laterality: Right;  R side ventriculoperitoneal shunt placement       Social History: Patient lives with SO at home.  Independent with [employer].  Works at [employer] and is in [employer] school.    Prior Intubation HX:  N/A this admission.  See medical hx.    Modified Barium Swallow: N/A    CT CERVICAL SPINE WITHOUT CONTRAST     CLINICAL HISTORY:  Neck pain, acute, prior cervical surgery;     TECHNIQUE:  Low dose axial images, sagittal and coronal reformations were performed though the cervical spine.  Contrast was not administered.     COMPARISON:  None     FINDINGS:  Normal vertebral body heights without evidence for spondylolisthesis.  Joint spaces are preserved.  No prevertebral soft tissue swelling.  Facet joints are congruent.  Normal bone mineral density.  A right-sided shunt catheter     Impression:     No acute fracture or dislocation.     Visualized right-sided  catheter is  intact     All CT scans   are performed using dose optimization techniques including the following: automated exposure control; adjustment of the mA and/or kV; use of iterative reconstruction technique.  Dose modulation was employed for ALARA by means of: Automated exposure control; adjustment of the mA and/or kV according to patient size (this includes techniques or standardized protocols for targeted exams where dose is matched to indication/reason for exam; i.e. extremities or head); and/or use of iterative reconstructive technique.        Electronically signed by: Fabian Joseph  Date:                                            10/11/2023  Time:                                           22:16     CT HEAD WITHOUT CONTRAST     CLINICAL HISTORY:  Stroke, follow up;     TECHNIQUE:  Low dose axial images were obtained through the head.  Coronal and sagittal reformations were also performed. Contrast was not administered.     COMPARISON:  CT brain 10/12/2023 at 03:16     FINDINGS:   shunt tube is unchanged in position with unchanged prominence of the right lateral ventricle.     No mass, mass effect, hemorrhage, acute infarction or abnormal fluid collection.     The mastoid air cells and visualized paranasal sinuses are unremarkable.  No depressed skull fracture or skull lesions.     Impression:     No interval change.        Electronically signed by: Yasir Johnson  Date:                                            10/12/2023  Time:                                           08:02    Prior diet: Pt consumes a regular diet. Denied dysphagia hx.    Subjective     Pt seen bedside for ST evaluation.  NO c/o pain. NO family present.  Patient goals: to improve strength, numbness     Pain/Comfort:  Pain Rating 1: 0/10  Pain Rating Post-Intervention 1: 0/10  Pain Rating 2: 0/10  Pain Rating Post-Intervention 2: 0/10    Respiratory Status: Room air    Objective:     Cognitive Status:    WFL       Receptive Language:    Comprehension:   WFL    Pragmatics:    WFL    Expressive Language:  Verbal:    WFL      Motor Speech:  WFL    Voice:   WFL    Oral Musculature Evaluation  Oral Musculature: WFL (?slight right eye asymmetry)  Dentition: present and adequate  Secretion Management: adequate  Mucosal Quality: good  Mandibular Strength and Mobility: WFL  Oral Labial Strength and Mobility: WFL  Lingual Strength and Mobility: WFL  Velar Elevation: WFL  Buccal Strength and Mobility: WFL  Volitional Cough: present  Volitional Swallow: present  Voice Prior to PO Intake: WF    Bedside Clinical Swallow Evaluation:   [place] Swallow Protocol:  [place] Swallow Protocol dictates patient remain NPO if fail screener (Murphyr et al. 2014).  The [place] Swallow Protocol was administered. The patient was alert and provided the instructions prior to the beginning of the protocol. The patient consumed 3 oz before putting the cup down. Patient drank with consecutive swallows. No overt s/s of pharyngeal dysphagia or aspiration were observed during today's assessment.    Clinical Swallow Examination:   Of note, patient self-fed throughout evaluation. Patient presented with:     CONSISTENCY  NOTES   THIN (IDDSI 0) 3 oz water challenge   Controlled cup, straw  Sequential swallows No overt s/s of aspiration/dysphagia  Belching present post CSE   PUREE (IDDSI 4/Extremely Thick)   TSP/TBSP bites of pudding/applesauce  No overt s/s of aspiration  C/O globus sensation requiring liquid rinse to improve symptoms   SOLID (IDDSI 7/Regular) Bite of Niyah Doone cookie    No overt s/s of aspiration  C/O globus sensation, requiring liquid rinse to improve symptoms     Thickened liquids were not used in this assessment. Russell (2018) reported that thickened liquids have no sound evidence at reducing the risk of pneumonia in patients with dysphagia and can cause harm by increasing their risk of dehydration. It also presents an increased risk of UTI, electrolyte imbalance,  constipation, fecal impaction, cognitive impairment, functional decline and even death (Langmore, 2002; [place], 2016).  Thickened liquids are associated with risks including dehydration, increased pharyngeal residue, potential interference with medication absorption, and decreased quality of life (Lamont, 2013). Thickened liquids are also more likely to be silently aspirated than thin liquids (Kenny et al., 2018). This supports the assertion that we should confirm a patient requires thickened liquids with an instrumental swallow study prior to recommending them.    References:   Lamont KAMINSKI (2013). Thickening agents used for dysphagia management: Effect on bioavailability of water, medication and feelings of satiety. Nutrition Journal, 12, 54. https://doi.org/10.1186/5254-2696-31-54    YAMILEX Cox, JUAN A Hatch, DARREL Mcelroy, & YAMILEX Maria (2018). Cough response to aspiration in thin and thick fluids during FEES in hospitalized inpatients. International journal of language & communication disorders, 53(5), 909-918. https://doi.org/10.1111/3629-2749.95756    INTERPRETATION AND RISK ASSESSMENT:  Clinical swallow evaluation (CSE) revealed oral phase characterized by lingual, labial, and buccal strength and range of motion functional for lip closure, bolus preparation and propulsion. The patient had no anterior loss of the bolus with complete closure of the lips around the utensils. No residue remained in the oral cavity following the swallow. Patient without overt clinical signs/symptoms of aspiration on any PO trials given.  Patient presents with a possibly inefficient swallow with reported globus sensation on trials of solids.   Clinical signs of suspected pharyngeal-esophageal dysphagia warranting further esophageal assessment and outpatient GI referral.    Compensatory Strategies  Aspiration/behavioral reflux    Assessment:     Earl Mcelroy is a 23 y.o. female admitted to [facility] with c/o right sided  numbness (UE, LE) with neck pain and dx cervical radiculopathy.  Pt with hx aqueductal stenosis s/p  shunt placement at [facility] 8/8/2023.  She presents with functional OM and communication.  No overt s/s of oropharyngeal dysphagia or aspiration during bedside CSE; however, pt c/o globus sensation during solid consumption, associated with belching and need for water rinse to assist with bolus transit.  Pt is recommended for continued IDDSI 7-regular solids and IDDSI 0-thin liquids, following aspiration and behavioral reflux precautions.  Pt would benefit from outpatient GI referral if dysphagia symptoms persist for further assessment.  ST to f/u diet tolerance.      Goals:   Multidisciplinary Problems       SLP Goals          Problem: SLP    Goal Priority Disciplines Outcome   SLP Goal     SLP    Description: 1.  Pt will consume IDDSI 7-regular solids and IDDSI 0-thin liquids without incident.                       Plan:     Patient to be seen:  2 x/week, 3 x/week   Plan of Care expires:  10/19/23  Plan of Care reviewed with:  patient   SLP Follow-Up:  Yes       Discharge recommendations:  Discharge Facility/Level of Care Needs: home   Barriers to Discharge:  None    Time Tracking:     SLP Treatment Date:   10/12/23  Speech Start Time:  1030  Speech Stop Time:  1100     Speech Total Time (min):  30 min    Billable Minutes: Eval 15 minutes  and Eval Swallow and Oral Function 15 minutes    10/12/2023

## 2023-10-12 NOTE — ASSESSMENT & PLAN NOTE
-S/P  shunt placement on 8/8/23 down right side. + Spurling's test with associated right-sided weakness.  -CT cervical spine no acute fracture or dislocation; visualized right sided  catheter in intact

## 2023-10-12 NOTE — ASSESSMENT & PLAN NOTE
S/P  shunt placement on 8/8/23 down right side. + Spurling's test with associated right-sided weakness.  Plan:  -MRI cervical spine if device is compatible

## 2023-10-12 NOTE — SUBJECTIVE & OBJECTIVE
"  Woke up with symptoms?: no    Recent bleeding noted: no  Does the patient take any Blood Thinners? no  Medications: No relevant medications      Past Medical History: diabetes    Past Surgical History:  S/p  shunt on 8/8/2023    Family History: no relevant history    Social History: no smoking, no drinking, no drugs    Allergies: No Known Allergies No relevant allergies    Review of Systems   Neurological:  Positive for weakness and numbness. Negative for headaches.     Objective:   Vitals: Blood pressure 114/70, pulse 91, temperature 97.1 °F (36.2 °C), temperature source Oral, resp. rate 18, height 5' 5" (1.651 m), weight 102.7 kg (226 lb 8.4 oz), SpO2 97 %. BP: 114/70    CT READ: No    Physical Exam  Neurological:      Mental Status: She is oriented to person, place, and time.      Cranial Nerves: No cranial nerve deficit, dysarthria or facial asymmetry.      Sensory: Sensory deficit present.      Coordination: Finger-Nose-Finger Test normal.      Comments: Questionable drift in the RUE               "

## 2023-10-12 NOTE — HOSPITAL COURSE
24 y/o female admitted with c/o right sided numbness and tingling with associated weakness that started prior to admission. She denies any injury or trauma. Initial CT head was negative. Patient recently had a  shunt placed for aqueductal stenosis on 8/8/2023 by Dr. Khoa Johnson in [place]. She also complains of neck pain. She was admitted for observation.   CUS was negative for stenosis. XR shunt series was negative for acute findings. CT cervical spine no acute fracture or dislocation. Visualized right sided  catheter is intact.  Repeat CT head done and it was also negative.   PT/OT evaluated patient and she was able to ambulate 180' with no AD and contact guard assistance; recommended outpatient PT/OT.    She is to follow up with PCP in 3-5 days.   Follow [place] with [place]. [place] for evaluation with 1 week.     Patient seen and examined on the day of discharge.  All questions and concerns were addressed prior to discharge.    Face to face encounter with patient: 34 minutes

## 2023-10-17 ENCOUNTER — TELEPHONE (OUTPATIENT)
Dept: NEUROSURGERY | Facility: CLINIC | Age: 23
End: 2023-10-17
Payer: MEDICAID

## 2023-10-17 NOTE — TELEPHONE ENCOUNTER
----- Message from Sanjay Hopson sent at 10/16/2023  6:10 PM CDT -----  Contact: patient  Type:  Needs Medical Advice    Who Called: patient   Would the patient rather a call back or a response via [facility]? Call back   Best Call Back Number: [phone] or [phone]  Additional Information: pt stated that's she need the setting on her shunt changed and and mri due to her having problems please assist

## 2023-10-23 ENCOUNTER — DOCUMENTATION ONLY (OUTPATIENT)
Dept: REHABILITATION | Facility: HOSPITAL | Age: 23
End: 2023-10-23

## 2023-11-07 ENCOUNTER — OFFICE VISIT (OUTPATIENT)
Dept: NEUROSURGERY | Facility: CLINIC | Age: 23
End: 2023-11-07
Payer: MEDICAID

## 2023-11-07 DIAGNOSIS — Q03.0 AQUEDUCTAL STENOSIS: Primary | ICD-10-CM

## 2023-11-07 DIAGNOSIS — Z98.2 S/P VP SHUNT: ICD-10-CM

## 2023-11-07 PROCEDURE — 3044F PR MOST RECENT HEMOGLOBIN A1C LEVEL <7.0%: ICD-10-PCS | Mod: CPTII,,, | Performed by: PHYSICIAN ASSISTANT

## 2023-11-07 PROCEDURE — 99999 PR PBB SHADOW E&M-EST. PATIENT-LVL II: ICD-10-PCS | Mod: PBBFAC,,, | Performed by: PHYSICIAN ASSISTANT

## 2023-11-07 PROCEDURE — 1160F PR REVIEW ALL MEDS BY PRESCRIBER/CLIN PHARMACIST DOCUMENTED: ICD-10-PCS | Mod: CPTII,,, | Performed by: PHYSICIAN ASSISTANT

## 2023-11-07 PROCEDURE — 99213 PR OFFICE/OUTPT VISIT, EST, LEVL III, 20-29 MIN: ICD-10-PCS | Mod: S$PBB,,, | Performed by: PHYSICIAN ASSISTANT

## 2023-11-07 PROCEDURE — 99999 PR PBB SHADOW E&M-EST. PATIENT-LVL II: CPT | Mod: PBBFAC,,, | Performed by: PHYSICIAN ASSISTANT

## 2023-11-07 PROCEDURE — 3044F HG A1C LEVEL LT 7.0%: CPT | Mod: CPTII,,, | Performed by: PHYSICIAN ASSISTANT

## 2023-11-07 PROCEDURE — 99212 OFFICE O/P EST SF 10 MIN: CPT | Mod: PBBFAC | Performed by: PHYSICIAN ASSISTANT

## 2023-11-07 PROCEDURE — 1159F MED LIST DOCD IN RCRD: CPT | Mod: CPTII,,, | Performed by: PHYSICIAN ASSISTANT

## 2023-11-07 PROCEDURE — 1159F PR MEDICATION LIST DOCUMENTED IN MEDICAL RECORD: ICD-10-PCS | Mod: CPTII,,, | Performed by: PHYSICIAN ASSISTANT

## 2023-11-07 PROCEDURE — 1160F RVW MEDS BY RX/DR IN RCRD: CPT | Mod: CPTII,,, | Performed by: PHYSICIAN ASSISTANT

## 2023-11-07 PROCEDURE — 99213 OFFICE O/P EST LOW 20 MIN: CPT | Mod: S$PBB,,, | Performed by: PHYSICIAN ASSISTANT

## 2023-11-07 NOTE — PROGRESS NOTES
Neurosurgery  Established Patient    SUBJECTIVE:     Interval History:  23 y.o. 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 Johnson.  Here today for neurosurgical follow up with updated head CT and shunt series.  Patient was seen in the emergency room in [place] on October 12th.  At that time patient had complaints of right hand redness, swelling, pain and numbness.  Head CT was done at that time which was stable.  Patient also had cervical spine CT that was done that did not explain her symptoms. Pt had negative cardiac and stroke w/u and was ultimately discharged.  RUE pain/redness/swelling has resolved.  In terms of her headaches they have improved some since last seen by Luci Tripathi PA-C.  She states that they are intermittent and predominantly around the shunt valve and sometimes in the temporal area when they occur.  The headaches are not positional.  They are not constant and progressive. Denies blurred vision or diplopia, speech difficulty, weakness, numbness, seizures, or balance difficulty.       Review of patient's allergies indicates:  No Known Allergies    Current Outpatient Medications   Medication Sig Dispense Refill    metFORMIN (GLUCOPHAGE) 500 MG tablet Take 500 mg by mouth once.       No current facility-administered medications for this visit.       Past Medical History:   Diagnosis Date    Aqueductal stenosis      Past Surgical History:   Procedure Laterality Date    ENDOSCOPIC INSERTION OF VENTRICULOPERITONEAL SHUNT N/A 8/8/2023    Procedure: INSERTION, SHUNT, VENTRICULOPERITONEAL, ENDOSCOPIC;  Surgeon: Khoa Johnson MD;  Location: [address];  Service: Neurosurgery;  Laterality: N/A;    VENTRICULOPERITONEAL SHUNT Right 8/8/2023    Procedure: INSERTION, SHUNT, VENTRICULOPERITONEAL;  Surgeon: Khoa Johnson MD;  Location: [facility] OR [address] [place] [address];  Service: Neurosurgery;  Laterality: Right;  R side  ventriculoperitoneal shunt placement     Family History       Problem Relation (Age of Onset)    Diabetes Paternal Uncle    Hypertension Mother, Father          Social History     Socioeconomic History    Marital status: Significant Other   Tobacco Use    Smoking status: Never    Smokeless tobacco: Never   Substance and Sexual Activity    Alcohol use: Never    Drug use: Never     Social Determinants of Health     Stress: Stress Concern Present (8/6/2023)    [employer] - Occupational Stress Questionnaire     Feeling of Stress : To some extent   Social Connections: Unknown (8/6/2023)    Social Connection and Isolation Panel [NHANES]     Frequency of Communication with Friends and Family: More than three times a week     Marital Status: Living with partner       OBJECTIVE:     Neurosurgery Physical Exam   Awake, Alert, NAD  Shunt reservoir pumps/refills  Cranial nerves II-XII grossly intact  Gait - ambulates in room without difficulty    Motor Strength:   Strength  Deltoids Triceps Biceps Wrist Extension Wrist Flexion Hand    Upper: R 5/5 5/5 5/5 5/5 5/5 5/5    L 5/5 5/5 5/5 5/5 5/5 5/5     Iliopsoas Quadriceps Knee  Flexion Tibialis  anterior Gastro- cnemius EHL   Lower: R 5/5 5/5 5/5 5/5 5/5 5/5    L 5/5 5/5 5/5 5/5 5/5 5/5   Sensory: intact to light touch B/L UE and LE  DTR's are 2+ and symmetric in B/L UE/LE     Diagnostic Results: personally reviewed  EXAMINATION:  CT HEAD WITHOUT CONTRAST     CLINICAL HISTORY:  Stroke, follow up;     TECHNIQUE:  Low dose axial images were obtained through the head.  Coronal and sagittal reformations were also performed. Contrast was not administered.     COMPARISON:  CT brain 10/12/2023 at 03:16     FINDINGS:   shunt tube is unchanged in position with unchanged prominence of the right lateral ventricle.     No mass, mass effect, hemorrhage, acute infarction or abnormal fluid collection.     The mastoid air cells and visualized paranasal sinuses  are unremarkable.  No depressed skull fracture or skull lesions.     Impression:     No interval change.        Electronically signed by: Yasir Augustinebrianna  Date:                                            10/12/2023  Time:                                           08:02    ASSESSMENT/PLAN:     23 y.o. female with h/o T2DM, migraines, and newly diagnosed hydrocephalus 2/2 aqueductal stenosis s/p  Shunt placement on 8/8.  Head CT shows stable configuration of ventricular system compared to prior study without acute intracranial abnormality.  Shunt series shows continuous tubing throughout.  Patient has mild intermittent headaches that are overall improved compared to prior visit.  No concern for shunt failure, or over shunting at this time.  We will maintain current Hakim valve setting at 100 mm of water.  Patient can follow up in 3 months with updated head CT which will be at the six-month bindu postop.  Patient is scheduled to follow up with PCP for further evaluation of intermittent right hand swelling and numbness. Encouraged to call the clinic with any questions or concerns in the meantime.      Rober Campoverde Jr. HEIDY  [facility]  Department of Neurosurgery     Note dictated with voice recognition software, please excuse any grammatical errors.

## 2024-02-05 ENCOUNTER — HOSPITAL ENCOUNTER (OUTPATIENT)
Dept: RADIOLOGY | Facility: HOSPITAL | Age: 24
Discharge: HOME | End: 2024-02-05
Attending: PHYSICIAN ASSISTANT
Payer: MEDICAID

## 2024-02-05 DIAGNOSIS — Q03.0 AQUEDUCTAL STENOSIS: ICD-10-CM

## 2024-02-05 DIAGNOSIS — Z98.2 S/P VP SHUNT: ICD-10-CM

## 2024-02-05 PROCEDURE — 70450 CT HEAD/BRAIN W/O DYE: CPT | Mod: 26,,, | Performed by: RADIOLOGY

## 2024-02-05 PROCEDURE — 70450 CT HEAD/BRAIN W/O DYE: CPT | Mod: TC

## 2024-02-28 ENCOUNTER — OFFICE VISIT (OUTPATIENT)
Dept: NEUROSURGERY | Facility: CLINIC | Age: 24
End: 2024-02-28
Payer: MEDICAID

## 2024-02-28 VITALS
HEART RATE: 65 BPM | BODY MASS INDEX: 37.61 KG/M2 | SYSTOLIC BLOOD PRESSURE: 129 MMHG | HEIGHT: 65 IN | DIASTOLIC BLOOD PRESSURE: 84 MMHG

## 2024-02-28 DIAGNOSIS — M62.838 MUSCLE SPASMS OF NECK: Primary | ICD-10-CM

## 2024-02-28 DIAGNOSIS — G91.9 HYDROCEPHALUS, UNSPECIFIED TYPE: ICD-10-CM

## 2024-02-28 DIAGNOSIS — Q03.0 AQUEDUCTAL STENOSIS: ICD-10-CM

## 2024-02-28 DIAGNOSIS — Z98.2 S/P VP SHUNT: ICD-10-CM

## 2024-02-28 PROCEDURE — 99212 OFFICE O/P EST SF 10 MIN: CPT | Mod: PBBFAC | Performed by: PHYSICIAN ASSISTANT

## 2024-02-28 PROCEDURE — 99214 OFFICE O/P EST MOD 30 MIN: CPT | Mod: S$PBB,,, | Performed by: PHYSICIAN ASSISTANT

## 2024-02-28 PROCEDURE — 3079F DIAST BP 80-89 MM HG: CPT | Mod: CPTII,,, | Performed by: PHYSICIAN ASSISTANT

## 2024-02-28 PROCEDURE — 3008F BODY MASS INDEX DOCD: CPT | Mod: CPTII,,, | Performed by: PHYSICIAN ASSISTANT

## 2024-02-28 PROCEDURE — 1159F MED LIST DOCD IN RCRD: CPT | Mod: CPTII,,, | Performed by: PHYSICIAN ASSISTANT

## 2024-02-28 PROCEDURE — 3074F SYST BP LT 130 MM HG: CPT | Mod: CPTII,,, | Performed by: PHYSICIAN ASSISTANT

## 2024-02-28 PROCEDURE — 99999 PR PBB SHADOW E&M-EST. PATIENT-LVL II: CPT | Mod: PBBFAC,,, | Performed by: PHYSICIAN ASSISTANT

## 2024-02-28 RX ORDER — CYCLOBENZAPRINE HCL 10 MG
10 TABLET ORAL NIGHTLY PRN
Qty: 15 TABLET | Refills: 0 | Status: SHIPPED | OUTPATIENT
Start: 2024-02-28

## 2024-02-28 RX ORDER — TIZANIDINE HYDROCHLORIDE 2 MG/1
1-2 CAPSULE, GELATIN COATED ORAL NIGHTLY PRN
Qty: 20 CAPSULE | Refills: 0 | Status: SHIPPED | OUTPATIENT
Start: 2024-02-28 | End: 2024-02-28 | Stop reason: RX

## 2024-02-28 NOTE — PROGRESS NOTES
Neurosurgery  Established Patient    SUBJECTIVE:     HPI:   23 y.o. 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 Johnson.  Here today for 6 month follow up with updated head CT. Patient reports she is overall doing well and headaches are improved from pre-op. She does note that when headaches do occur - she has to lay down in a quiet and dark room. They are not constant and progressive. Denies blurred vision or diplopia, speech difficulty, weakness, numbness, seizures, or balance difficulty. She does note pressure type sensation that is brief when she laughs, sneezes, coughs, has BM. She reports pain in right shoulder after hitting it at work on accident. No weakness/numbness/ecchymosis.       Review of patient's allergies indicates:  No Known Allergies    Current Outpatient Medications   Medication Sig Dispense Refill    metFORMIN (GLUCOPHAGE) 500 MG tablet Take 500 mg by mouth once.      cyclobenzaprine (FLEXERIL) 10 MG tablet Take 1 tablet (10 mg total) by mouth nightly as needed for Muscle spasms. 15 tablet 0     No current facility-administered medications for this visit.       Past Medical History:   Diagnosis Date    Aqueductal stenosis      Past Surgical History:   Procedure Laterality Date    ENDOSCOPIC INSERTION OF VENTRICULOPERITONEAL SHUNT N/A 8/8/2023    Procedure: INSERTION, SHUNT, VENTRICULOPERITONEAL, ENDOSCOPIC;  Surgeon: Khoa Johnson MD;  Location: [address];  Service: Neurosurgery;  Laterality: N/A;    VENTRICULOPERITONEAL SHUNT Right 8/8/2023    Procedure: INSERTION, SHUNT, VENTRICULOPERITONEAL;  Surgeon: Khoa Johnson MD;  Location: [address];  Service: Neurosurgery;  Laterality: Right;  R side ventriculoperitoneal shunt placement     Family History       Problem Relation (Age of Onset)    Diabetes Paternal Uncle    Hypertension Mother, Father          Social History     Socioeconomic History     Marital status: Significant Other   Tobacco Use    Smoking status: Never    Smokeless tobacco: Never   Substance and Sexual Activity    Alcohol use: Never    Drug use: Never     Social Determinants of Health     Stress: Stress Concern Present (8/6/2023)    [employer] [place] [employer] - Occupational Stress Questionnaire     Feeling of Stress : To some extent   Social Connections: Unknown (8/6/2023)    Social Connection and Isolation Panel [NHANES]     Frequency of Communication with Friends and Family: More than three times a week     Marital Status: Living with partner       OBJECTIVE:     Neurosurgery Physical Exam   General: well developed, well nourished, no distress  Neurologic: Alert and oriented. Thought content appropriate.   GCS: Motor: 6/Verbal: 5/Eyes: 4 GCS Total: 15   Mental Status: Awake, Alert, Oriented x3   Cranial nerves: face symmetric, tongue midline, pupils equal, round, reactive to light with accomodation, EOMI.   Motor Strength: moves all extremities with good strength and tone   Sensation: response to light touch throughout  No gait disturbances     Diagnostic Results:   I have personally reviewed imaging and agree with the findings    CTH (2/5/24): Right ventriculostomy tube is present. This transverses the right lateral ventricle and terminates near midline near the septum pellucidum. There is no midline shift. Ventricles appear stable in size. There is no hemorrhage or mass.     ASSESSMENT/PLAN:     23 y.o. female with with newly diagnosed HCP 2/2 aqueductal stenosis who presents to clinic today for her 2 week wound check s/p VPS on 8/8 with Dr. Johnson (Hakim @ 100).     Head CT shows stable configuration of ventricular system compared to prior study without acute intracranial abnormality.  No concern for shunt failure, or over shunting at this time. We will maintain current Hakim valve setting at 100 mm of water. We can consider increasing setting if pressure symptoms with  valsalva continue to be bothersome. I will also refer her headache neurology for her chronic headaches. I will prescribe her flexeril qhs PRN for her shoulder muscle spasms.     I would like the patient to follow-up in clinic in 1 month. I have encouraged her to contact the clinic with any questions, concerns, or adverse clinical changes. She verbalized understanding.     Luci Tripathi PA-C  Neurosurgery   [facility]    Time spent on this encounter: 30 minutes. This includes face-to-face time and non-face to face time preparing to see the patient (eg, review of tests), obtaining and/or reviewing separately obtained history, documenting clinical information in the electronic or other health record, independently interpreting results and communicating results to the patient/family/caregiver, or care coordinator

## 2024-03-19 ENCOUNTER — PATIENT MESSAGE (OUTPATIENT)
Dept: NEUROSURGERY | Facility: CLINIC | Age: 24
End: 2024-03-19
Payer: MEDICAID

## 2024-03-27 ENCOUNTER — OFFICE VISIT (OUTPATIENT)
Dept: NEUROSURGERY | Facility: CLINIC | Age: 24
End: 2024-03-27
Payer: MEDICAID

## 2024-03-27 DIAGNOSIS — Q03.0 AQUEDUCTAL STENOSIS: Primary | ICD-10-CM

## 2024-03-27 DIAGNOSIS — Z98.2 S/P VP SHUNT: ICD-10-CM

## 2024-03-27 PROCEDURE — 99211 OFF/OP EST MAY X REQ PHY/QHP: CPT | Mod: 95,,, | Performed by: PHYSICIAN ASSISTANT

## 2024-03-27 NOTE — PROGRESS NOTES
The patient location is: [place]  The chief complaint leading to consultation is: follow up     Visit type: audiovisual    Face to Face time with patient: 15 minutes of total time spent on the encounter, which includes face to face time and non-face to face time preparing to see the patient (eg, review of tests), Obtaining and/or reviewing separately obtained history, Documenting clinical information in the electronic or other health record, Independently interpreting results (not separately reported) and communicating results to the patient/family/caregiver, or Care coordination (not separately reported).         Each patient to whom he or she provides medical services by telemedicine is:  (1) informed of the relationship between the physician and patient and the respective role of any other health care provider with respect to management of the patient; and (2) notified that he or she may decline to receive medical services by telemedicine and may withdraw from such care at any time.    Notes:     Neurosurgery  Established Patient    SUBJECTIVE:     History of Present Illness:  23 y.o. 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 Johnson here for follow up appointment. Patient reports continued pressure like headaches with valsalva which do not impact her from continuing her daily activities.    Review of patient's allergies indicates:  No Known Allergies    Current Outpatient Medications   Medication Sig Dispense Refill    cyclobenzaprine (FLEXERIL) 10 MG tablet Take 1 tablet (10 mg total) by mouth nightly as needed for Muscle spasms. 15 tablet 0    metFORMIN (GLUCOPHAGE) 500 MG tablet Take 500 mg by mouth once.       No current facility-administered medications for this visit.       Past Medical History:   Diagnosis Date    Aqueductal stenosis      Past Surgical History:   Procedure Laterality Date    ENDOSCOPIC INSERTION OF  VENTRICULOPERITONEAL SHUNT N/A 8/8/2023    Procedure: INSERTION, SHUNT, VENTRICULOPERITONEAL, ENDOSCOPIC;  Surgeon: Khoa Johnson MD;  Location: [facility] OR 2ND FLR;  Service: Neurosurgery;  Laterality: N/A;    VENTRICULOPERITONEAL SHUNT Right 8/8/2023    Procedure: INSERTION, SHUNT, VENTRICULOPERITONEAL;  Surgeon: Khoa Johnson MD;  Location: [facility] OR 2ND FLR;  Service: Neurosurgery;  Laterality: Right;  R side ventriculoperitoneal shunt placement     Family History       Problem Relation (Age of Onset)    Diabetes Paternal Uncle    Hypertension Mother, Father          Social History     Socioeconomic History    Marital status: Significant Other   Tobacco Use    Smoking status: Never    Smokeless tobacco: Never   Substance and Sexual Activity    Alcohol use: Never    Drug use: Never     Social Determinants of Health     Stress: Stress Concern Present (8/6/2023)    [facility] [employer] of Occupational Health - Occupational Stress Questionnaire     Feeling of Stress : To some extent   Social Connections: Unknown (8/6/2023)    Social Connection and Isolation Panel [NHANES]     Frequency of Communication with Friends and Family: More than three times a week     Marital Status: Living with partner       Review of Systems    OBJECTIVE:     Vital Signs     There is no height or weight on file to calculate BMI.    Neurosurgery Physical Exam  General: well developed, well nourished, no distress.   Head: normocephalic  Neurologic: Alert and oriented. Thought content appropriate.  GCS: Motor: 6/Verbal: 5/Eyes: 4 GCS Total: 15  Mental Status: Awake, Alert, Oriented x 4  Language: No aphasia  Speech: No dysarthria  Cranial nerves: CN II-XII grossly intact.   Eyes: EOMI appear grossly intact  Pulmonary: no signs of respiratory distress  Motor Strength:Moves all extremities spontaneously with good tone. BUE and BLE are at least 3/5. No abnormal movements seen.         Diagnostic Results:  N/a    ASSESSMENT/PLAN:     23 y.o. female  with with newly diagnosed HCP 2/2 aqueductal stenosis who is s/p VPS on 8/8 with Dr. Johnson (Gigimelbasari @ 100).     -Continue shunt at current setting as patient's symptoms are not impacting her day to day life and she is overall improved with shunt  -I would like the patient to follow-up in clinic PNR. I have encouraged her to contact the clinic with any questions, concerns, or adverse clinical changes. She verbalized understanding.     Luci Tripathi PA-C  Neurosurgery   [facility]          Note dictated with voice recognition software, please excuse any grammatical errors.

## 2024-05-13 ENCOUNTER — TELEPHONE (OUTPATIENT)
Dept: NEUROSURGERY | Facility: CLINIC | Age: 24
End: 2024-05-13
Payer: MEDICAID

## 2024-05-13 NOTE — TELEPHONE ENCOUNTER
----- Message from No Porras sent at 5/13/2024 10:39 AM CDT -----  Regarding: Pt Advice  Contact: [phone]  Pt calling to speak with nurse regarding document needed for work about lifting. Pt had recent shunt surgery. Please call [phone]

## (undated) DEVICE — PASSER CATH SHORT 55 CM

## (undated) DEVICE — TUBING INSUFFLATION HEATED

## (undated) DEVICE — SUT VICRYL PLUS 3-0 SH 18IN

## (undated) DEVICE — DRESSING TEGADERM 2X2 3/4

## (undated) DEVICE — NDL HYPO REG 25G X 1 1/2

## (undated) DEVICE — TROCAR ENDOPATH XCEL 15MM 10CM

## (undated) DEVICE — CLOSURE SKIN STERI STRIP 1/2X4

## (undated) DEVICE — TUBE FRAZIER 5MM 2FT SOFT TIP

## (undated) DEVICE — SUT MONOCRYL 4-0 PS-1 UND

## (undated) DEVICE — BIT DRILL PERFORATOR DISP 14MM

## (undated) DEVICE — CORD BIPOLAR 12 FOOT

## (undated) DEVICE — MARKER SKIN STND TIP BLUE BARR

## (undated) DEVICE — TRAY NEURO OMC

## (undated) DEVICE — DURAPREP SURG SCRUB 26ML

## (undated) DEVICE — DIFFUSER

## (undated) DEVICE — TROCAR ENDOPATH XCEL 5MM 7.5CM

## (undated) DEVICE — CARTRIDGE OIL

## (undated) DEVICE — DRESSING MEDIPORE CLTH 3.5X6IN

## (undated) DEVICE — KIT ANTIFOG W/SPONG & FLUID

## (undated) DEVICE — SUT SILK 2-0 SH 18IN BLACK

## (undated) DEVICE — SUT VICRYL PLUS 0 CT1 18IN

## (undated) DEVICE — SUT GUT PL. 4-0 27 FS-2

## (undated) DEVICE — TRACKER PATIENT NON INVASIVE

## (undated) DEVICE — SUT CTD VICRYL 0 UND BR

## (undated) DEVICE — DRESSING TELFA N ADH 3X8

## (undated) DEVICE — BURR ACORN 7.5MM

## (undated) DEVICE — SUT SILK BLK BR. 2 2-60

## (undated) DEVICE — DRAPE EENT SPLIT STERILE

## (undated) DEVICE — PAD CURAD NONADH 3X4IN

## (undated) DEVICE — ELECTRODE REM PLYHSV RETURN 9

## (undated) DEVICE — ADHESIVE MASTISOL VIAL 48/BX

## (undated) DEVICE — CLOSURE SKIN STERI STRIP 1/4X3

## (undated) DEVICE — STAPLER SKIN PROXIMATE WIDE

## (undated) DEVICE — CATH PASSER DISPOSABLE

## (undated) DEVICE — STYLET AXIEM 23CM SINGLE COIL

## (undated) DEVICE — ELECTRODE BLADE INSULATED 1 IN

## (undated) DEVICE — SUT 2-0 12-18IN SILK

## (undated) DEVICE — SUT 4/0 18IN NUROLON BLK B

## (undated) DEVICE — DRAPE INCISE IOBAN 2 23X17IN